# Patient Record
Sex: FEMALE | Race: WHITE | Employment: FULL TIME | URBAN - METROPOLITAN AREA
[De-identification: names, ages, dates, MRNs, and addresses within clinical notes are randomized per-mention and may not be internally consistent; named-entity substitution may affect disease eponyms.]

---

## 2017-02-20 NOTE — TELEPHONE ENCOUNTER
Last OV pertinent to medication: 3/29/16  Last refill date: 11/21/16     #/refills: 180/0  When pt was asked to return for OV: none  Upcoming appt/reason: none     Lab Results  Component Value Date   * 08/24/2016   BUN 12 08/24/2016   CREATSERUM 0.7

## 2017-02-27 RX ORDER — LANCETS 33 GAUGE
EACH MISCELLANEOUS
Qty: 100 EACH | Refills: 0 | Status: SHIPPED | OUTPATIENT
Start: 2017-02-27 | End: 2017-05-26

## 2017-03-09 RX ORDER — LISINOPRIL AND HYDROCHLOROTHIAZIDE 20; 12.5 MG/1; MG/1
TABLET ORAL
Qty: 90 TABLET | Refills: 0 | Status: SHIPPED | OUTPATIENT
Start: 2017-03-09 | End: 2017-06-06

## 2017-03-09 RX ORDER — LABETALOL 100 MG/1
TABLET, FILM COATED ORAL
Qty: 180 TABLET | Refills: 0 | Status: SHIPPED | OUTPATIENT
Start: 2017-03-09 | End: 2017-06-06

## 2017-03-09 RX ORDER — ATORVASTATIN CALCIUM 40 MG/1
TABLET, FILM COATED ORAL
Qty: 90 TABLET | Refills: 0 | Status: SHIPPED | OUTPATIENT
Start: 2017-03-09 | End: 2017-06-06

## 2017-03-09 NOTE — TELEPHONE ENCOUNTER
Did not pass old protocol, no ov in past 6 months. Passes revised protocol with ov in next 3 months.     Last OV pertinent to medication: 3/29/16  Last refill date: 12/12/16     #/refills: #90/0  When pt was asked to return for OV: n/a  Upcoming appt/reaso

## 2017-03-22 ENCOUNTER — APPOINTMENT (OUTPATIENT)
Dept: LAB | Age: 47
End: 2017-03-22
Attending: INTERNAL MEDICINE
Payer: COMMERCIAL

## 2017-03-22 DIAGNOSIS — R73.02 IGT (IMPAIRED GLUCOSE TOLERANCE): ICD-10-CM

## 2017-03-22 DIAGNOSIS — I10 ESSENTIAL HYPERTENSION: ICD-10-CM

## 2017-03-22 DIAGNOSIS — E78.00 PURE HYPERCHOLESTEROLEMIA: ICD-10-CM

## 2017-03-22 LAB
ALBUMIN SERPL-MCNC: 3.9 G/DL (ref 3.5–4.8)
ALP LIVER SERPL-CCNC: 80 U/L (ref 39–100)
ALT SERPL-CCNC: 25 U/L (ref 14–54)
AST SERPL-CCNC: 13 U/L (ref 15–41)
BILIRUB SERPL-MCNC: 0.3 MG/DL (ref 0.1–2)
BUN BLD-MCNC: 14 MG/DL (ref 8–20)
CALCIUM BLD-MCNC: 8.8 MG/DL (ref 8.3–10.3)
CHLORIDE: 100 MMOL/L (ref 101–111)
CHOLEST SMN-MCNC: 195 MG/DL (ref ?–200)
CO2: 30 MMOL/L (ref 22–32)
CREAT BLD-MCNC: 0.82 MG/DL (ref 0.55–1.02)
EST. AVERAGE GLUCOSE BLD GHB EST-MCNC: 126 MG/DL (ref 68–126)
GLUCOSE BLD-MCNC: 123 MG/DL (ref 70–99)
HBA1C MFR BLD HPLC: 6 % (ref ?–5.7)
HDLC SERPL-MCNC: 77 MG/DL (ref 45–?)
HDLC SERPL: 2.53 {RATIO} (ref ?–4.44)
LDLC SERPL CALC-MCNC: 93 MG/DL (ref ?–130)
M PROTEIN MFR SERPL ELPH: 7.3 G/DL (ref 6.1–8.3)
NONHDLC SERPL-MCNC: 118 MG/DL (ref ?–130)
POTASSIUM SERPL-SCNC: 4.2 MMOL/L (ref 3.6–5.1)
SODIUM SERPL-SCNC: 137 MMOL/L (ref 136–144)
TRIGLYCERIDES: 126 MG/DL (ref ?–150)
VLDL: 25 MG/DL (ref 5–40)

## 2017-03-22 PROCEDURE — 80061 LIPID PANEL: CPT

## 2017-03-22 PROCEDURE — 83036 HEMOGLOBIN GLYCOSYLATED A1C: CPT

## 2017-03-22 PROCEDURE — 80053 COMPREHEN METABOLIC PANEL: CPT

## 2017-03-23 ENCOUNTER — OFFICE VISIT (OUTPATIENT)
Dept: INTERNAL MEDICINE CLINIC | Facility: CLINIC | Age: 47
End: 2017-03-23

## 2017-03-23 DIAGNOSIS — R73.02 IGT (IMPAIRED GLUCOSE TOLERANCE): ICD-10-CM

## 2017-03-23 DIAGNOSIS — J45.20 MILD INTERMITTENT ASTHMA WITHOUT COMPLICATION: Primary | ICD-10-CM

## 2017-03-23 DIAGNOSIS — E78.00 PURE HYPERCHOLESTEROLEMIA: ICD-10-CM

## 2017-03-23 DIAGNOSIS — I10 ESSENTIAL HYPERTENSION: ICD-10-CM

## 2017-03-23 PROCEDURE — 99214 OFFICE O/P EST MOD 30 MIN: CPT | Performed by: INTERNAL MEDICINE

## 2017-03-23 RX ORDER — DOXEPIN HYDROCHLORIDE 50 MG/1
1 CAPSULE ORAL DAILY
COMMUNITY

## 2017-03-23 NOTE — PROGRESS NOTES
Camden Noonan is a 55year old female. To F/U from last visit regarding IGT, HTN and high cholesterol   HPI:    Interim history:she is feeling fine .  Still travels fro her work  She checks an occ blood sugar  Her asthma is controlled, she reacts a bit breath with exertion,or cough ACT 21   CARDIOVASCULAR: denies chest pain on exertion  GI: denies abdominal pain and denies heartburn, change in appetite or bm's  NEURO: denies headaches or dizziness    EXAM:   /76 mmHg  Pulse 76  Temp(Src) 98.2 °F (3 CPM  Essential hypertension- controlled CPM    No orders of the defined types were placed in this encounter.        Meds & Refills for this Visit:    No prescriptions requested or ordered in this encounter       Imaging & Consults:  None    No Follow-up on

## 2017-05-01 ENCOUNTER — OFFICE VISIT (OUTPATIENT)
Dept: INTERNAL MEDICINE CLINIC | Facility: CLINIC | Age: 47
End: 2017-05-01

## 2017-05-01 VITALS
SYSTOLIC BLOOD PRESSURE: 114 MMHG | DIASTOLIC BLOOD PRESSURE: 82 MMHG | HEART RATE: 62 BPM | TEMPERATURE: 98 F | OXYGEN SATURATION: 99 % | RESPIRATION RATE: 16 BRPM

## 2017-05-01 DIAGNOSIS — J06.9 UPPER RESPIRATORY TRACT INFECTION, UNSPECIFIED TYPE: ICD-10-CM

## 2017-05-01 DIAGNOSIS — Z91.09 ENVIRONMENTAL ALLERGIES: ICD-10-CM

## 2017-05-01 DIAGNOSIS — R05.9 COUGH: Primary | ICD-10-CM

## 2017-05-01 PROCEDURE — 99213 OFFICE O/P EST LOW 20 MIN: CPT | Performed by: PHYSICIAN ASSISTANT

## 2017-05-01 RX ORDER — CODEINE PHOSPHATE AND GUAIFENESIN 10; 100 MG/5ML; MG/5ML
5 SOLUTION ORAL EVERY 6 HOURS PRN
Qty: 118 ML | Refills: 0 | Status: SHIPPED | OUTPATIENT
Start: 2017-05-01 | End: 2017-05-06

## 2017-05-01 RX ORDER — AZITHROMYCIN 250 MG/1
TABLET, FILM COATED ORAL
Qty: 6 TABLET | Refills: 0 | Status: SHIPPED | OUTPATIENT
Start: 2017-05-01 | End: 2017-05-26 | Stop reason: ALTCHOICE

## 2017-05-01 NOTE — PROGRESS NOTES
Abdifatah Hernandez is a 52year old female  Patient presents with:  URI  Bleeding: Nose Bleeds      HPI:   Pt states that she started to feel sick last week  - more similar to sinus infection but then it started to get better until yesterday when she start Levocetirizine Dihydrochloride (XYZAL) 5 MG Oral Tab Take 1 tablet by mouth daily.  Disp:  Rfl:       Patient Active Problem List:     HTN (hypertension)     Hyperlipemia     IGT (impaired glucose tolerance)     Mild intermittent asthma without complicati to help with dry nose/bleeding  - advised to contact office if symptoms fail to improve or worsen in next 3-4 days    No orders of the defined types were placed in this encounter.        Meds & Refills for this Visit:  Signed Prescriptions Disp Refills    a

## 2017-05-19 NOTE — TELEPHONE ENCOUNTER
Last OV pertinent to medication: 3/23/17  Last refill date: 2/20/17     #/refills: 180/0  When pt was asked to return for OV:6 months  Upcoming appt/reason: 9/21/17 a1c f/u     Lab Results  Component Value Date    03/22/2017   A1C 6.0* 03/22/2017

## 2017-05-26 RX ORDER — LANCETS 33 GAUGE
EACH MISCELLANEOUS
Qty: 100 EACH | Refills: 1 | Status: SHIPPED | OUTPATIENT
Start: 2017-05-26 | End: 2017-11-22

## 2017-06-07 RX ORDER — LABETALOL 100 MG/1
TABLET, FILM COATED ORAL
Qty: 180 TABLET | Refills: 0 | Status: SHIPPED | OUTPATIENT
Start: 2017-06-07 | End: 2017-09-05

## 2017-06-07 RX ORDER — ATORVASTATIN CALCIUM 40 MG/1
TABLET, FILM COATED ORAL
Qty: 90 TABLET | Refills: 0 | Status: SHIPPED | OUTPATIENT
Start: 2017-06-07 | End: 2017-09-05

## 2017-06-07 RX ORDER — LISINOPRIL AND HYDROCHLOROTHIAZIDE 20; 12.5 MG/1; MG/1
TABLET ORAL
Qty: 90 TABLET | Refills: 0 | Status: SHIPPED | OUTPATIENT
Start: 2017-06-07 | End: 2017-09-05

## 2017-06-27 ENCOUNTER — HOSPITAL ENCOUNTER (OUTPATIENT)
Age: 47
Discharge: HOME | End: 2017-06-27
Attending: FAMILY MEDICINE
Payer: COMMERCIAL

## 2017-06-27 VITALS
BODY MASS INDEX: 32.44 KG/M2 | RESPIRATION RATE: 18 BRPM | HEART RATE: 81 BPM | TEMPERATURE: 98 F | DIASTOLIC BLOOD PRESSURE: 100 MMHG | HEIGHT: 64 IN | OXYGEN SATURATION: 96 % | WEIGHT: 190 LBS | SYSTOLIC BLOOD PRESSURE: 148 MMHG

## 2017-06-27 DIAGNOSIS — B02.9 HERPES ZOSTER WITHOUT COMPLICATION: Primary | ICD-10-CM

## 2017-06-27 PROCEDURE — 99214 OFFICE O/P EST MOD 30 MIN: CPT

## 2017-06-27 PROCEDURE — 99213 OFFICE O/P EST LOW 20 MIN: CPT

## 2017-06-27 RX ORDER — VALACYCLOVIR HYDROCHLORIDE 1 G/1
TABLET, FILM COATED ORAL
Qty: 21 TABLET | Refills: 0 | Status: SHIPPED | OUTPATIENT
Start: 2017-06-27 | End: 2017-10-19 | Stop reason: ALTCHOICE

## 2017-06-28 NOTE — ED PROVIDER NOTES
Patient Seen in: [facility] END    History   Patient presents with:  Rash Skin Problem (integumentary)    Stated Complaint: woke up this morning with a rash on her back, burns and itches    HPI    This 59-year-old female presents to Tab,  Take 1 tablet by mouth daily. Albuterol Sulfate HFA (PROAIR HFA) 108 (90 BASE) MCG/ACT Inhalation Aero Soln,  Inhale 1-2 puffs into the lungs every 4 (four) hours as needed.    Levocetirizine Dihydrochloride (XYZAL) 5 MG Oral Tab,  Take 1 tablet by normal.  EARS: Tympanic membranes normal, EAC's normal.  NOSE: Turbinates normal, no bleeding noted. PHARYNX:  No eythema or exudates, tonsils normal size, airway patent, uvula midline  NECK:  No cervical lymphadenopathy.  No thyromegaly,  HEART: Regular r and pregnant women until the rash has resolved. Follow-up with your primary doctor in 3-5 days for any new or worsening symptoms. The rash may take 7-10 days to resolve.

## 2017-06-28 NOTE — ED INITIAL ASSESSMENT (HPI)
Started last night with a pimple and it itched  Burning sensation  Presents with bandaide to back left side scapula

## 2017-08-17 NOTE — TELEPHONE ENCOUNTER
Last OV pertinent to medication: 3/23/17  Last refill date: 5/19/17     #/refills: 180/0  When pt was asked to return for OV: none noted  Upcoming appt/reason: 9/21/17 f/u a1c    Lab Results  Component Value Date    03/22/2017   A1C 6.0 (H) 03/22/20

## 2017-09-01 ENCOUNTER — PATIENT MESSAGE (OUTPATIENT)
Dept: INTERNAL MEDICINE CLINIC | Facility: CLINIC | Age: 47
End: 2017-09-01

## 2017-09-01 RX ORDER — BLOOD-GLUCOSE METER
KIT MISCELLANEOUS
Qty: 1 DEVICE | Refills: 0 | Status: SHIPPED | OUTPATIENT
Start: 2017-09-01

## 2017-09-01 NOTE — TELEPHONE ENCOUNTER
From: Janet Nick  To: Saima Rincon MD  Sent: 9/1/2017 12:01 PM CDT  Subject: Prescription Question    Do I need a new meter or will those Freestyle Strips fit my One Touch?

## 2017-09-06 RX ORDER — LABETALOL 100 MG/1
TABLET, FILM COATED ORAL
Qty: 180 TABLET | Refills: 0 | Status: SHIPPED | OUTPATIENT
Start: 2017-09-06 | End: 2017-12-05

## 2017-09-06 RX ORDER — LISINOPRIL AND HYDROCHLOROTHIAZIDE 20; 12.5 MG/1; MG/1
TABLET ORAL
Qty: 90 TABLET | Refills: 0 | Status: SHIPPED | OUTPATIENT
Start: 2017-09-06 | End: 2017-12-05

## 2017-09-06 RX ORDER — ATORVASTATIN CALCIUM 40 MG/1
TABLET, FILM COATED ORAL
Qty: 90 TABLET | Refills: 0 | Status: SHIPPED | OUTPATIENT
Start: 2017-09-06 | End: 2017-12-05

## 2017-10-26 ENCOUNTER — TELEPHONE (OUTPATIENT)
Dept: OBGYN CLINIC | Facility: CLINIC | Age: 47
End: 2017-10-26

## 2017-10-26 NOTE — TELEPHONE ENCOUNTER
Pt has requested records to be sent To Dr Porsche Severino: pap, colpo,IUD insertion/removal from any years of treatment  [address], [address] [address]  Sent to scan October 26,2017

## 2017-11-15 ENCOUNTER — TELEPHONE (OUTPATIENT)
Dept: INTERNAL MEDICINE CLINIC | Facility: CLINIC | Age: 47
End: 2017-11-15

## 2017-11-15 NOTE — TELEPHONE ENCOUNTER
Incoming (mail or fax): Fax  Received from:  [facility]  Documentation given to:  Shiloh Cornell test results bin.

## 2017-11-15 NOTE — TELEPHONE ENCOUNTER
Received mammogram from [facility], results WNL. To Dr Antonio Centeno for review.

## 2017-11-15 NOTE — TELEPHONE ENCOUNTER
Last OV relevant to medication: 5/1/17  Last refill date: 8/17/17     #/refills: 180/0  When pt was asked to return for OV: none  Upcoming appt/reason: none    Lab Results  Component Value Date    03/22/2017   A1C 6.0 (H) 03/22/2017

## 2017-11-22 RX ORDER — LANCETS 33 GAUGE
EACH MISCELLANEOUS
Qty: 100 EACH | Refills: 1 | Status: SHIPPED | OUTPATIENT
Start: 2017-11-22 | End: 2018-03-08 | Stop reason: ALTCHOICE

## 2017-11-29 RX ORDER — BLOOD-GLUCOSE METER
KIT MISCELLANEOUS
Qty: 100 STRIP | Refills: 0 | Status: SHIPPED | OUTPATIENT
Start: 2017-11-29 | End: 2018-09-04

## 2017-11-29 NOTE — TELEPHONE ENCOUNTER
Received call from [employer] [place] [employer]. States that [employer] plan does not cover Onetouch Ultra Blue strips, but does cover Freestyle lite strips. Order for Freestyle lite strips pended. Please advise.   OK to change to [facility]

## 2017-11-29 NOTE — TELEPHONE ENCOUNTER
Incoming (mail or fax): Fax  Received from:  [facility]  Documentation given to:  [id number] [employer] fax bin.

## 2017-12-05 RX ORDER — LABETALOL 100 MG/1
TABLET, FILM COATED ORAL
Qty: 180 TABLET | Refills: 1 | Status: SHIPPED | OUTPATIENT
Start: 2017-12-05 | End: 2018-06-03

## 2017-12-05 RX ORDER — ATORVASTATIN CALCIUM 40 MG/1
TABLET, FILM COATED ORAL
Qty: 90 TABLET | Refills: 0 | Status: SHIPPED | OUTPATIENT
Start: 2017-12-05 | End: 2018-03-05

## 2017-12-05 RX ORDER — LISINOPRIL AND HYDROCHLOROTHIAZIDE 20; 12.5 MG/1; MG/1
TABLET ORAL
Qty: 90 TABLET | Refills: 1 | Status: SHIPPED | OUTPATIENT
Start: 2017-12-05 | End: 2018-06-03

## 2017-12-05 NOTE — TELEPHONE ENCOUNTER
Last OV relevant to medication: 3/23/2017  Last refill date: 9/6/2017     #/refills: 90/0  When pt was asked to return for OV: none noted  Upcoming appt/reason: none

## 2018-02-07 ENCOUNTER — HOSPITAL ENCOUNTER (OUTPATIENT)
Age: 48
Discharge: HOME | End: 2018-02-07
Attending: FAMILY MEDICINE
Payer: COMMERCIAL

## 2018-02-07 VITALS
TEMPERATURE: 98 F | DIASTOLIC BLOOD PRESSURE: 80 MMHG | HEART RATE: 70 BPM | RESPIRATION RATE: 20 BRPM | OXYGEN SATURATION: 97 % | SYSTOLIC BLOOD PRESSURE: 137 MMHG

## 2018-02-07 DIAGNOSIS — J01.91 ACUTE RECURRENT SINUSITIS, UNSPECIFIED LOCATION: Primary | ICD-10-CM

## 2018-02-07 PROCEDURE — 99213 OFFICE O/P EST LOW 20 MIN: CPT

## 2018-02-07 PROCEDURE — 99214 OFFICE O/P EST MOD 30 MIN: CPT

## 2018-02-07 RX ORDER — FLUTICASONE PROPIONATE 50 MCG
2 SPRAY, SUSPENSION (ML) NASAL DAILY
Qty: 16 G | Refills: 0 | Status: SHIPPED | OUTPATIENT
Start: 2018-02-07 | End: 2018-03-09

## 2018-02-07 RX ORDER — DOXYCYCLINE HYCLATE 100 MG
100 TABLET ORAL 2 TIMES DAILY
Qty: 20 TABLET | Refills: 0 | Status: SHIPPED | OUTPATIENT
Start: 2018-02-07 | End: 2018-02-17

## 2018-02-07 NOTE — ED INITIAL ASSESSMENT (HPI)
Head and nose congestion for the last five days. Also has been having chest congestion and hard to take deep breaths.

## 2018-02-08 NOTE — ED PROVIDER NOTES
Patient Seen in: [facility] END    History   Patient presents with:  Stuffy Nose    Stated Complaint: 5 DAY COLD SYMPTOMS,STUFFY NOSE,SOME COUGH    HPI    52year old female presents for nasal congestion, sinus pressure and cough.  Trupti Shah other systems reviewed and negative except as noted above.     Physical Exam   ED Triage Vitals [02/07/18 1020]  BP: 137/80  Pulse: 70  Resp: 20  Temp: 98.2 °F (36.8 °C)  Temp src: Temporal  SpO2: 97 %  O2 Device: None (Room air)    Current:/80   Puls diagnosis)    Disposition:  Discharge  2/7/2018 10:46 am    Follow-up:  Demar Field, [address] [phone]    In 1 week  If symptoms worsen        Medications Prescribed:  Discharge Medication List a

## 2018-02-19 ENCOUNTER — TELEPHONE (OUTPATIENT)
Dept: INTERNAL MEDICINE CLINIC | Facility: CLINIC | Age: 48
End: 2018-02-19

## 2018-02-19 DIAGNOSIS — E78.00 PURE HYPERCHOLESTEROLEMIA: ICD-10-CM

## 2018-02-19 DIAGNOSIS — I10 ESSENTIAL HYPERTENSION: ICD-10-CM

## 2018-02-19 DIAGNOSIS — R73.02 IGT (IMPAIRED GLUCOSE TOLERANCE): Primary | ICD-10-CM

## 2018-02-19 NOTE — TELEPHONE ENCOUNTER
Pt has IGT. Last A1c 3/2017, pt went to lab thinking an order was there. No order on file. Due for annual visit, do you want this checked now or have pt schedule annual and order with any other necessary labs at that time? Thanks!

## 2018-02-19 NOTE — TELEPHONE ENCOUNTER
Pt stopped in to lab to have A1C, there were no orders for her. Thought she was overdue. Please advise if she needs this.  Thank you

## 2018-02-19 NOTE — TELEPHONE ENCOUNTER
Labs ordered. Spoke with pt, advised A1c, CMP, Lipid ordered, fast 10-12 hrs. Pt verbalized understanding.

## 2018-02-20 ENCOUNTER — APPOINTMENT (OUTPATIENT)
Dept: LAB | Age: 48
End: 2018-02-20
Attending: INTERNAL MEDICINE
Payer: COMMERCIAL

## 2018-02-20 DIAGNOSIS — R73.02 IGT (IMPAIRED GLUCOSE TOLERANCE): ICD-10-CM

## 2018-02-20 DIAGNOSIS — E78.00 PURE HYPERCHOLESTEROLEMIA: ICD-10-CM

## 2018-02-20 DIAGNOSIS — I10 ESSENTIAL HYPERTENSION: ICD-10-CM

## 2018-02-20 LAB
ALBUMIN SERPL-MCNC: 3.7 G/DL (ref 3.5–4.8)
ALP LIVER SERPL-CCNC: 80 U/L (ref 39–100)
ALT SERPL-CCNC: 23 U/L (ref 14–54)
AST SERPL-CCNC: 14 U/L (ref 15–41)
BILIRUB SERPL-MCNC: 0.5 MG/DL (ref 0.1–2)
BUN BLD-MCNC: 11 MG/DL (ref 8–20)
CALCIUM BLD-MCNC: 8.9 MG/DL (ref 8.3–10.3)
CHLORIDE: 102 MMOL/L (ref 101–111)
CHOLEST SMN-MCNC: 179 MG/DL (ref ?–200)
CO2: 29 MMOL/L (ref 22–32)
CREAT BLD-MCNC: 0.64 MG/DL (ref 0.55–1.02)
EST. AVERAGE GLUCOSE BLD GHB EST-MCNC: 140 MG/DL (ref 68–126)
GLUCOSE BLD-MCNC: 112 MG/DL (ref 70–99)
HBA1C MFR BLD HPLC: 6.5 % (ref ?–5.7)
HDLC SERPL-MCNC: 80 MG/DL (ref 45–?)
HDLC SERPL: 2.24 {RATIO} (ref ?–4.44)
LDLC SERPL CALC-MCNC: 72 MG/DL (ref ?–130)
M PROTEIN MFR SERPL ELPH: 6.8 G/DL (ref 6.1–8.3)
NONHDLC SERPL-MCNC: 99 MG/DL (ref ?–130)
POTASSIUM SERPL-SCNC: 4.2 MMOL/L (ref 3.6–5.1)
SODIUM SERPL-SCNC: 137 MMOL/L (ref 136–144)
TRIGL SERPL-MCNC: 136 MG/DL (ref ?–150)
VLDLC SERPL CALC-MCNC: 27 MG/DL (ref 5–40)

## 2018-02-20 PROCEDURE — 80061 LIPID PANEL: CPT | Performed by: INTERNAL MEDICINE

## 2018-02-20 PROCEDURE — 83036 HEMOGLOBIN GLYCOSYLATED A1C: CPT | Performed by: INTERNAL MEDICINE

## 2018-02-20 PROCEDURE — 80053 COMPREHEN METABOLIC PANEL: CPT | Performed by: INTERNAL MEDICINE

## 2018-02-20 PROCEDURE — 36415 COLL VENOUS BLD VENIPUNCTURE: CPT | Performed by: INTERNAL MEDICINE

## 2018-03-08 ENCOUNTER — OFFICE VISIT (OUTPATIENT)
Dept: INTERNAL MEDICINE CLINIC | Facility: CLINIC | Age: 48
End: 2018-03-08

## 2018-03-08 VITALS
RESPIRATION RATE: 14 BRPM | BODY MASS INDEX: 35.17 KG/M2 | SYSTOLIC BLOOD PRESSURE: 120 MMHG | HEIGHT: 63.5 IN | HEART RATE: 68 BPM | OXYGEN SATURATION: 99 % | WEIGHT: 201 LBS | TEMPERATURE: 98 F | DIASTOLIC BLOOD PRESSURE: 80 MMHG

## 2018-03-08 DIAGNOSIS — I10 ESSENTIAL HYPERTENSION: ICD-10-CM

## 2018-03-08 DIAGNOSIS — E78.00 PURE HYPERCHOLESTEROLEMIA: ICD-10-CM

## 2018-03-08 DIAGNOSIS — R73.02 IGT (IMPAIRED GLUCOSE TOLERANCE): Primary | ICD-10-CM

## 2018-03-08 PROCEDURE — 99213 OFFICE O/P EST LOW 20 MIN: CPT | Performed by: INTERNAL MEDICINE

## 2018-03-08 RX ORDER — ATORVASTATIN CALCIUM 40 MG/1
TABLET, FILM COATED ORAL
Qty: 90 TABLET | Refills: 1 | Status: SHIPPED | OUTPATIENT
Start: 2018-03-08 | End: 2018-09-02

## 2018-03-08 NOTE — PROGRESS NOTES
James Jin is a 52year old female.  To F/U from last visit regarding pre-diabetes and HTN and high cholesterol  HPI:    Interim history:very stessed at work  No watching her diet or exercising, too tired  Had a bad URI last month and has not bounce (hypertension)     Hyperlipemia     IGT (impaired glucose tolerance)     Mild intermittent asthma without complication        REVIEW OF SYSTEMS:   GENERAL HEALTH: feels well otherwise      EXAM:   /80 (BP Location: Left arm, Patient Position: Sitting 6/8/2018) for multiple issues. There are no Patient Instructions on file for this visit. The patient indicates understanding of these issues and agrees to the plan.

## 2018-03-18 ENCOUNTER — HOSPITAL ENCOUNTER (OUTPATIENT)
Age: 48
Discharge: HOME | End: 2018-03-18
Payer: COMMERCIAL

## 2018-03-18 VITALS
RESPIRATION RATE: 18 BRPM | TEMPERATURE: 98 F | OXYGEN SATURATION: 99 % | SYSTOLIC BLOOD PRESSURE: 149 MMHG | DIASTOLIC BLOOD PRESSURE: 83 MMHG | HEART RATE: 82 BPM

## 2018-03-18 DIAGNOSIS — J06.9 UPPER RESPIRATORY TRACT INFECTION, UNSPECIFIED TYPE: ICD-10-CM

## 2018-03-18 DIAGNOSIS — J32.0 CHRONIC MAXILLARY SINUSITIS: Primary | ICD-10-CM

## 2018-03-18 PROCEDURE — 99214 OFFICE O/P EST MOD 30 MIN: CPT

## 2018-03-18 PROCEDURE — 99213 OFFICE O/P EST LOW 20 MIN: CPT

## 2018-03-18 RX ORDER — ALBUTEROL SULFATE 90 UG/1
2 AEROSOL, METERED RESPIRATORY (INHALATION) EVERY 4 HOURS PRN
Qty: 1 INHALER | Refills: 0 | Status: SHIPPED | OUTPATIENT
Start: 2018-03-18 | End: 2018-04-17

## 2018-03-18 RX ORDER — PREDNISONE 20 MG/1
40 TABLET ORAL DAILY
Qty: 10 TABLET | Refills: 0 | Status: SHIPPED | OUTPATIENT
Start: 2018-03-18 | End: 2018-03-23

## 2018-03-18 RX ORDER — CEFDINIR 300 MG/1
300 CAPSULE ORAL 2 TIMES DAILY
Qty: 20 CAPSULE | Refills: 0 | Status: SHIPPED | OUTPATIENT
Start: 2018-03-18 | End: 2018-03-28

## 2018-03-18 NOTE — ED PROVIDER NOTES
Patient Seen in: [facility]    History   Patient presents with:  Cough/URI    Stated Complaint: SHORTNESS OF BREATH/BODY ACHES/FACE PREASURE    HPI    Patient is a pleasant 55-year-old female.   Patient is moderate seasonal aller 0.6 oz/week     Standard drinks or equivalent: 0 - 1 per week     Comment: 2-3 glass of wine on the weekends      Review of Systems    Positive for stated complaint: SHORTNESS OF BREATH/BODY ACHES/FACE PREASURE  Other systems are as noted in HPI.   Constitu diagnosis)  Upper respiratory tract infection, unspecified type    Disposition:  Discharge  3/18/2018 11:55 am    Follow-up:  Sean James, [address] [phone]              Medications Prescribed:

## 2018-03-18 NOTE — ED INITIAL ASSESSMENT (HPI)
C/O chills, bodyaches and fever that started Friday. C/O sinus issues that she has been seen for last month as well.

## 2018-05-14 NOTE — TELEPHONE ENCOUNTER
Last OV relevant to medication: 3/8/18  Last refill date: 11/15/17    #180/refills:1  When pt was asked to return for OV: 3 months  Upcoming appt/reason:6/21/18  Didn't pass protocol due to no urine micro alb

## 2018-06-04 RX ORDER — LABETALOL 100 MG/1
TABLET, FILM COATED ORAL
Qty: 180 TABLET | Refills: 1 | Status: SHIPPED | OUTPATIENT
Start: 2018-06-04 | End: 2018-12-01

## 2018-06-04 RX ORDER — LISINOPRIL AND HYDROCHLOROTHIAZIDE 20; 12.5 MG/1; MG/1
TABLET ORAL
Qty: 90 TABLET | Refills: 1 | Status: SHIPPED | OUTPATIENT
Start: 2018-06-04 | End: 2018-12-01

## 2018-07-16 ENCOUNTER — APPOINTMENT (OUTPATIENT)
Dept: LAB | Age: 48
End: 2018-07-16
Attending: INTERNAL MEDICINE
Payer: COMMERCIAL

## 2018-07-16 DIAGNOSIS — E78.00 PURE HYPERCHOLESTEROLEMIA: ICD-10-CM

## 2018-07-16 DIAGNOSIS — I10 ESSENTIAL HYPERTENSION: ICD-10-CM

## 2018-07-16 DIAGNOSIS — R73.02 IGT (IMPAIRED GLUCOSE TOLERANCE): ICD-10-CM

## 2018-07-16 LAB
EST. AVERAGE GLUCOSE BLD GHB EST-MCNC: 134 MG/DL (ref 68–126)
HBA1C MFR BLD HPLC: 6.3 % (ref ?–5.7)

## 2018-07-16 PROCEDURE — 83036 HEMOGLOBIN GLYCOSYLATED A1C: CPT | Performed by: INTERNAL MEDICINE

## 2018-07-16 PROCEDURE — 36415 COLL VENOUS BLD VENIPUNCTURE: CPT | Performed by: INTERNAL MEDICINE

## 2018-07-17 ENCOUNTER — OFFICE VISIT (OUTPATIENT)
Dept: INTERNAL MEDICINE CLINIC | Facility: CLINIC | Age: 48
End: 2018-07-17
Payer: COMMERCIAL

## 2018-07-17 ENCOUNTER — PATIENT MESSAGE (OUTPATIENT)
Dept: INTERNAL MEDICINE CLINIC | Facility: CLINIC | Age: 48
End: 2018-07-17

## 2018-07-17 VITALS
DIASTOLIC BLOOD PRESSURE: 76 MMHG | WEIGHT: 190 LBS | SYSTOLIC BLOOD PRESSURE: 112 MMHG | BODY MASS INDEX: 33.25 KG/M2 | HEIGHT: 63.5 IN | OXYGEN SATURATION: 99 % | RESPIRATION RATE: 16 BRPM | HEART RATE: 76 BPM | TEMPERATURE: 98 F

## 2018-07-17 VITALS
BODY MASS INDEX: 34.82 KG/M2 | HEIGHT: 63.5 IN | SYSTOLIC BLOOD PRESSURE: 130 MMHG | WEIGHT: 199 LBS | TEMPERATURE: 98 F | HEART RATE: 76 BPM | OXYGEN SATURATION: 98 % | DIASTOLIC BLOOD PRESSURE: 84 MMHG

## 2018-07-17 DIAGNOSIS — R73.02 IGT (IMPAIRED GLUCOSE TOLERANCE): Primary | ICD-10-CM

## 2018-07-17 PROCEDURE — 99213 OFFICE O/P EST LOW 20 MIN: CPT | Performed by: INTERNAL MEDICINE

## 2018-07-17 NOTE — PROGRESS NOTES
James Jin is a 50year old female. To F/U from last visit regarding IGT  HPI:    Interim history:her A1C was 6.5 3 mos ago. She just returned from a 4 week trip to [employer] but walked an enormous amount. No wt change.  Ate a lot there  She gets int otherwise    EXAM:   /84 (BP Location: Left arm, Patient Position: Sitting, Cuff Size: adult)   Pulse 76   Temp 98.1 °F (36.7 °C) (Oral)   Ht 63.5\"   Wt 199 lb   LMP 07/08/2018   SpO2 98%   BMI 34.70 kg/m²   GENERAL: well developed, well nourished,i

## 2018-07-18 NOTE — TELEPHONE ENCOUNTER
From: Carlo Kearney  To: Juwan Gonzalez MD  Sent: 7/17/2018 5:09 PM CDT  Subject: Test Results Question    Hi there! Dr. Kaitlynn Wells was [facility] and for some reason I haven't gotten them.  Can someone please send

## 2018-08-14 NOTE — TELEPHONE ENCOUNTER
Last OV relevant to medication: 7/17/18  Last refill date: 5/14/18     #/refills: 180/0  When pt was asked to return for OV: 3 months - PE  Upcoming appt/reason: 10/9/18 - PE    Lab Results  Component Value Date    (H) 07/16/2018   A1C 6.3 (H) 07/16

## 2018-09-03 ENCOUNTER — PATIENT MESSAGE (OUTPATIENT)
Dept: INTERNAL MEDICINE CLINIC | Facility: CLINIC | Age: 48
End: 2018-09-03

## 2018-09-03 RX ORDER — BLOOD-GLUCOSE METER
KIT MISCELLANEOUS
Qty: 100 STRIP | Refills: 0 | Status: CANCELLED
Start: 2018-09-03

## 2018-09-04 RX ORDER — LANCETS 28 GAUGE
1 EACH MISCELLANEOUS DAILY
Qty: 1 BOX | Refills: 0 | Status: SHIPPED | OUTPATIENT
Start: 2018-09-04 | End: 2019-03-04

## 2018-09-04 RX ORDER — BLOOD-GLUCOSE METER
KIT MISCELLANEOUS
Qty: 50 STRIP | Refills: 0 | Status: SHIPPED | OUTPATIENT
Start: 2018-09-04 | End: 2019-03-04

## 2018-09-04 RX ORDER — BLOOD-GLUCOSE METER
KIT MISCELLANEOUS
Qty: 100 STRIP | Refills: 3 | Status: SHIPPED | OUTPATIENT
Start: 2018-09-04 | End: 2019-09-01

## 2018-09-04 RX ORDER — ATORVASTATIN CALCIUM 40 MG/1
TABLET, FILM COATED ORAL
Qty: 90 TABLET | Refills: 0 | Status: SHIPPED | OUTPATIENT
Start: 2018-09-04 | End: 2018-12-02

## 2018-09-04 RX ORDER — LANCETS 28 GAUGE
1 EACH MISCELLANEOUS DAILY
Qty: 100 EACH | Refills: 3 | Status: SHIPPED | OUTPATIENT
Start: 2018-09-04 | End: 2019-09-04

## 2018-09-04 NOTE — TELEPHONE ENCOUNTER
From: Jose C Urban  To: Lizzeth Beltran MD  Sent: 9/3/2018 4:19 PM CDT  Subject: Prescription Question    Our insurance stopped covering One Touch and no longer ships the lancelets and test strips through [facility] any longer.  They have

## 2018-09-04 NOTE — TELEPHONE ENCOUNTER
From: Huyen Samano  Sent: 9/3/2018 11:20 AM CDT  Subject: Medication Renewal Request    Joe Singh.  George Joseph would like a refill of the following medications:     Glucose Blood (FREESTYLE LITE TEST) In Vitro Strip [Edmar Cruz, NP]   Patient Comment:

## 2018-09-04 NOTE — TELEPHONE ENCOUNTER
From: Jeremiah Stack  To: Samson Eugene MD  Sent: 9/3/2018 4:20 PM CDT  Subject: Prescription Question    Correction it's a Freestyle Lite. Thanks.

## 2018-09-04 NOTE — PROGRESS NOTES
Spoke with pt to clarify pharmacies. Needs short term supply to local, full rx to mailorder. Spoke with pharmacy, lowest qty for strips is 50, for lancets 100. Pt advised.

## 2018-11-12 NOTE — TELEPHONE ENCOUNTER
Left Message for Patient to call back and schedule PE with Dr. Mallorie Frias. Awaiting call back.       Last OV relevant to medication: 7/17/2018  Last refill date: 8/14/18     #/refills: 180/0  When pt was asked to return for OV: 3 months - PE  Upcoming appt/reas

## 2018-12-03 RX ORDER — ATORVASTATIN CALCIUM 40 MG/1
TABLET, FILM COATED ORAL
Qty: 90 TABLET | Refills: 0 | Status: SHIPPED | OUTPATIENT
Start: 2018-12-03 | End: 2019-03-04

## 2018-12-03 RX ORDER — LISINOPRIL AND HYDROCHLOROTHIAZIDE 20; 12.5 MG/1; MG/1
TABLET ORAL
Qty: 90 TABLET | Refills: 0 | Status: SHIPPED | OUTPATIENT
Start: 2018-12-03 | End: 2019-03-04

## 2018-12-03 RX ORDER — LABETALOL 100 MG/1
TABLET, FILM COATED ORAL
Qty: 180 TABLET | Refills: 0 | Status: SHIPPED | OUTPATIENT
Start: 2018-12-03 | End: 2019-03-04

## 2019-02-07 ENCOUNTER — LABORATORY ENCOUNTER (OUTPATIENT)
Dept: LAB | Age: 49
End: 2019-02-07
Attending: OBSTETRICS & GYNECOLOGY
Payer: COMMERCIAL

## 2019-02-07 DIAGNOSIS — R53.83 FATIGUE, UNSPECIFIED TYPE: ICD-10-CM

## 2019-02-07 LAB
BASOPHILS # BLD AUTO: 0.08 X10(3) UL (ref 0–0.2)
BASOPHILS NFR BLD AUTO: 1.3 %
DEPRECATED RDW RBC AUTO: 43.1 FL (ref 35.1–46.3)
EOSINOPHIL # BLD AUTO: 0.24 X10(3) UL (ref 0–0.7)
EOSINOPHIL NFR BLD AUTO: 3.9 %
ERYTHROCYTE [DISTWIDTH] IN BLOOD BY AUTOMATED COUNT: 12.8 % (ref 11–15)
EST. AVERAGE GLUCOSE BLD GHB EST-MCNC: 143 MG/DL (ref 68–126)
HBA1C MFR BLD HPLC: 6.6 % (ref ?–5.7)
HCT VFR BLD AUTO: 38.1 % (ref 35–48)
HGB BLD-MCNC: 12.8 G/DL (ref 12–16)
IMM GRANULOCYTES # BLD AUTO: 0.02 X10(3) UL (ref 0–1)
IMM GRANULOCYTES NFR BLD: 0.3 %
LYMPHOCYTES # BLD AUTO: 1.73 X10(3) UL (ref 1–4)
LYMPHOCYTES NFR BLD AUTO: 28 %
MCH RBC QN AUTO: 30.9 PG (ref 26–34)
MCHC RBC AUTO-ENTMCNC: 33.6 G/DL (ref 31–37)
MCV RBC AUTO: 92 FL (ref 80–100)
MONOCYTES # BLD AUTO: 0.46 X10(3) UL (ref 0.1–1)
MONOCYTES NFR BLD AUTO: 7.4 %
NEUTROPHILS # BLD AUTO: 3.65 X10 (3) UL (ref 1.5–7.7)
NEUTROPHILS # BLD AUTO: 3.65 X10(3) UL (ref 1.5–7.7)
NEUTROPHILS NFR BLD AUTO: 59.1 %
PLATELET # BLD AUTO: 264 10(3)UL (ref 150–450)
RBC # BLD AUTO: 4.14 X10(6)UL (ref 3.8–5.3)
TSI SER-ACNC: 1.87 MIU/ML (ref 0.35–5.5)
WBC # BLD AUTO: 6.2 X10(3) UL (ref 4–11)

## 2019-02-07 PROCEDURE — 83036 HEMOGLOBIN GLYCOSYLATED A1C: CPT

## 2019-02-07 PROCEDURE — 36415 COLL VENOUS BLD VENIPUNCTURE: CPT

## 2019-02-07 PROCEDURE — 85025 COMPLETE CBC W/AUTO DIFF WBC: CPT

## 2019-02-07 PROCEDURE — 84443 ASSAY THYROID STIM HORMONE: CPT

## 2019-02-07 NOTE — TELEPHONE ENCOUNTER
Last OV relevant to medication: 7/17/18  Last refill date: 11/12/18  #/refills: 60/0  When pt was asked to return for OV: 6 months  Upcoming appt/reason: None    Spoke to patient via email, she stated she had left over prescription from [facility] to

## 2019-02-07 NOTE — TELEPHONE ENCOUNTER
Needs appt  A1C drawn by her gyne shows DM now  Needs extended appt w/me    Also need to change 1 month Rx to local pharmacy

## 2019-02-08 NOTE — TELEPHONE ENCOUNTER
[employer] message sent patient to notify her that her that 30 days worth of Metformin was sent to local pharmacy and that she needs to schedule an appointment. Also notified that her latest a1c did indicate DM.  Then called [facility] and they stated the

## 2019-02-18 ENCOUNTER — MED REC SCAN ONLY (OUTPATIENT)
Dept: INTERNAL MEDICINE CLINIC | Facility: CLINIC | Age: 49
End: 2019-02-18

## 2019-02-25 ENCOUNTER — TELEPHONE (OUTPATIENT)
Dept: INTERNAL MEDICINE CLINIC | Facility: CLINIC | Age: 49
End: 2019-02-25

## 2019-02-25 NOTE — TELEPHONE ENCOUNTER
Incoming (mail or fax): Fax  Received from:  [id number]   Documentation given to: [employer])

## 2019-03-03 RX ORDER — ATORVASTATIN CALCIUM 40 MG/1
TABLET, FILM COATED ORAL
Qty: 90 TABLET | Refills: 0 | Status: CANCELLED | OUTPATIENT
Start: 2019-03-03

## 2019-03-03 RX ORDER — LABETALOL 100 MG/1
TABLET, FILM COATED ORAL
Qty: 180 TABLET | Refills: 0 | Status: CANCELLED | OUTPATIENT
Start: 2019-03-03

## 2019-03-03 RX ORDER — LISINOPRIL AND HYDROCHLOROTHIAZIDE 20; 12.5 MG/1; MG/1
TABLET ORAL
Qty: 90 TABLET | Refills: 0 | Status: CANCELLED | OUTPATIENT
Start: 2019-03-03

## 2019-03-04 ENCOUNTER — OFFICE VISIT (OUTPATIENT)
Dept: INTERNAL MEDICINE CLINIC | Facility: CLINIC | Age: 49
End: 2019-03-04
Payer: COMMERCIAL

## 2019-03-04 VITALS
TEMPERATURE: 98 F | DIASTOLIC BLOOD PRESSURE: 76 MMHG | SYSTOLIC BLOOD PRESSURE: 124 MMHG | WEIGHT: 194 LBS | OXYGEN SATURATION: 98 % | HEART RATE: 68 BPM | RESPIRATION RATE: 16 BRPM | BODY MASS INDEX: 33.12 KG/M2 | HEIGHT: 64 IN

## 2019-03-04 DIAGNOSIS — J45.20 MILD INTERMITTENT ASTHMA WITHOUT COMPLICATION: ICD-10-CM

## 2019-03-04 DIAGNOSIS — E78.00 PURE HYPERCHOLESTEROLEMIA: ICD-10-CM

## 2019-03-04 DIAGNOSIS — I10 ESSENTIAL HYPERTENSION: ICD-10-CM

## 2019-03-04 DIAGNOSIS — R73.02 IGT (IMPAIRED GLUCOSE TOLERANCE): Primary | ICD-10-CM

## 2019-03-04 PROCEDURE — 99214 OFFICE O/P EST MOD 30 MIN: CPT | Performed by: INTERNAL MEDICINE

## 2019-03-04 RX ORDER — ALBUTEROL SULFATE 90 UG/1
1-2 AEROSOL, METERED RESPIRATORY (INHALATION) EVERY 4 HOURS PRN
Qty: 1 INHALER | Refills: 1 | Status: SHIPPED | OUTPATIENT
Start: 2019-03-04 | End: 2020-10-29

## 2019-03-04 RX ORDER — ATORVASTATIN CALCIUM 40 MG/1
40 TABLET, FILM COATED ORAL
Qty: 90 TABLET | Refills: 1 | Status: SHIPPED | OUTPATIENT
Start: 2019-03-04 | End: 2019-08-30

## 2019-03-04 RX ORDER — LABETALOL 100 MG/1
100 TABLET, FILM COATED ORAL 2 TIMES DAILY
Qty: 180 TABLET | Refills: 1 | Status: SHIPPED | OUTPATIENT
Start: 2019-03-04 | End: 2019-08-30

## 2019-03-04 RX ORDER — LISINOPRIL AND HYDROCHLOROTHIAZIDE 20; 12.5 MG/1; MG/1
1 TABLET ORAL
Qty: 90 TABLET | Refills: 1 | Status: SHIPPED | OUTPATIENT
Start: 2019-03-04 | End: 2019-08-30

## 2019-03-04 NOTE — PROGRESS NOTES
Dionte Canales is a 50year old female. To F/U from last visit regarding IGT/HTN and high cholesterol and asthma  HPI:     Interim history:her A1C went up and she immediately began treadmill 4 days weekly and WW and has lost 10 lb since feb11.  She is f tobacco: Never Used      Tobacco comment: Noted 2/20/2012: \"1/2 pk/day\"    Alcohol use:  Yes      Alcohol/week: 0.0 - 0.6 oz      Comment: 2-3 glass of wine on the weekends    Drug use: No     Patient Active Problem List:     HTN (hypertension)     Hyperl taken great action w/new lifestyle measures, we will keep everything the same, won't change any diagnoses at this time and recheck labs 3 mos- may be aggravated by her prempro  Her ultimate goal weight it 145lb  Essential hypertension- at goal, CPM  Pure h

## 2019-09-01 DIAGNOSIS — R73.02 IGT (IMPAIRED GLUCOSE TOLERANCE): Primary | ICD-10-CM

## 2019-09-03 RX ORDER — BLOOD-GLUCOSE METER
KIT MISCELLANEOUS
Qty: 100 STRIP | Refills: 0 | Status: SHIPPED | OUTPATIENT
Start: 2019-09-03 | End: 2019-12-01

## 2019-09-04 RX ORDER — LABETALOL 100 MG/1
TABLET, FILM COATED ORAL
Qty: 60 TABLET | Refills: 0 | Status: SHIPPED | OUTPATIENT
Start: 2019-09-04 | End: 2019-10-14

## 2019-09-04 RX ORDER — ATORVASTATIN CALCIUM 40 MG/1
TABLET, FILM COATED ORAL
Qty: 30 TABLET | Refills: 0 | Status: SHIPPED | OUTPATIENT
Start: 2019-09-04 | End: 2019-10-14

## 2019-09-04 RX ORDER — LISINOPRIL AND HYDROCHLOROTHIAZIDE 20; 12.5 MG/1; MG/1
TABLET ORAL
Qty: 30 TABLET | Refills: 0 | Status: SHIPPED | OUTPATIENT
Start: 2019-09-04 | End: 2019-10-14

## 2019-10-09 ENCOUNTER — LAB ENCOUNTER (OUTPATIENT)
Dept: LAB | Age: 49
End: 2019-10-09
Attending: INTERNAL MEDICINE
Payer: COMMERCIAL

## 2019-10-09 DIAGNOSIS — R73.02 IGT (IMPAIRED GLUCOSE TOLERANCE): ICD-10-CM

## 2019-10-09 PROCEDURE — 80053 COMPREHEN METABOLIC PANEL: CPT | Performed by: INTERNAL MEDICINE

## 2019-10-09 PROCEDURE — 80061 LIPID PANEL: CPT | Performed by: INTERNAL MEDICINE

## 2019-10-09 PROCEDURE — 83036 HEMOGLOBIN GLYCOSYLATED A1C: CPT | Performed by: INTERNAL MEDICINE

## 2019-10-09 PROCEDURE — 36415 COLL VENOUS BLD VENIPUNCTURE: CPT | Performed by: INTERNAL MEDICINE

## 2019-10-14 ENCOUNTER — OFFICE VISIT (OUTPATIENT)
Dept: INTERNAL MEDICINE CLINIC | Facility: CLINIC | Age: 49
End: 2019-10-14
Payer: COMMERCIAL

## 2019-10-14 VITALS
SYSTOLIC BLOOD PRESSURE: 126 MMHG | DIASTOLIC BLOOD PRESSURE: 80 MMHG | OXYGEN SATURATION: 99 % | HEIGHT: 64 IN | WEIGHT: 191.81 LBS | BODY MASS INDEX: 32.74 KG/M2 | HEART RATE: 64 BPM | RESPIRATION RATE: 14 BRPM

## 2019-10-14 DIAGNOSIS — E78.00 PURE HYPERCHOLESTEROLEMIA: ICD-10-CM

## 2019-10-14 DIAGNOSIS — I10 ESSENTIAL HYPERTENSION: ICD-10-CM

## 2019-10-14 DIAGNOSIS — E66.09 CLASS 1 OBESITY DUE TO EXCESS CALORIES WITHOUT SERIOUS COMORBIDITY WITH BODY MASS INDEX (BMI) OF 32.0 TO 32.9 IN ADULT: ICD-10-CM

## 2019-10-14 DIAGNOSIS — R73.02 IGT (IMPAIRED GLUCOSE TOLERANCE): Primary | ICD-10-CM

## 2019-10-14 PROCEDURE — 99214 OFFICE O/P EST MOD 30 MIN: CPT | Performed by: INTERNAL MEDICINE

## 2019-10-14 RX ORDER — ATORVASTATIN CALCIUM 40 MG/1
40 TABLET, FILM COATED ORAL
Qty: 90 TABLET | Refills: 1 | Status: SHIPPED | OUTPATIENT
Start: 2019-10-14 | End: 2020-04-13

## 2019-10-14 RX ORDER — LISINOPRIL AND HYDROCHLOROTHIAZIDE 20; 12.5 MG/1; MG/1
1 TABLET ORAL
Qty: 90 TABLET | Refills: 1 | Status: SHIPPED | OUTPATIENT
Start: 2019-10-14 | End: 2020-04-13

## 2019-10-14 RX ORDER — LABETALOL 100 MG/1
100 TABLET, FILM COATED ORAL 2 TIMES DAILY
Qty: 180 TABLET | Refills: 1 | Status: SHIPPED | OUTPATIENT
Start: 2019-10-14 | End: 2020-04-13

## 2019-10-14 NOTE — PATIENT INSTRUCTIONS
Start metamucil fiber supplement  Daily  Start w/1/2 dose then incerase gradually to full dose  Drink lots of water throughout the day

## 2019-10-14 NOTE — PROGRESS NOTES
Emily Briceno is a 52year old female. To F/U from last visit regarding IGT, HTN and DL  HPI:    Interim history:    She stopped [employer] because she \"didin't like their program, didn't stop her from eating too many calories of the allowable foods\".  Has n date: 2012        Years since quittin.2      Smokeless tobacco: Never Used      Tobacco comment: Noted 2012: \"1/2 pk/day\"    Alcohol use:  Yes      Alcohol/week: 0.0 - 1.0 standard drinks      Comment: 2-3 glass of wine on the weekends     - 2.0         ASSESSMENT AND PLAN:   Igt (impaired glucose tolerance)  (primary encounter diagnosis)-A1C came down, repeat 6 mos  Essential hypertension- at goal, CPM  Pure hypercholesterolemia- on statin, CPM  Class 1 obesity due to excess calories withou

## 2019-12-01 DIAGNOSIS — R73.02 IGT (IMPAIRED GLUCOSE TOLERANCE): ICD-10-CM

## 2019-12-03 RX ORDER — BLOOD-GLUCOSE METER
KIT MISCELLANEOUS
Qty: 100 STRIP | Refills: 0 | Status: SHIPPED | OUTPATIENT
Start: 2019-12-03 | End: 2020-03-04

## 2020-02-03 ENCOUNTER — HOSPITAL ENCOUNTER (OUTPATIENT)
Age: 50
Discharge: HOME | End: 2020-02-03
Payer: COMMERCIAL

## 2020-02-03 VITALS
HEIGHT: 64 IN | SYSTOLIC BLOOD PRESSURE: 144 MMHG | BODY MASS INDEX: 31.58 KG/M2 | HEART RATE: 72 BPM | RESPIRATION RATE: 16 BRPM | DIASTOLIC BLOOD PRESSURE: 91 MMHG | TEMPERATURE: 98 F | WEIGHT: 185 LBS | OXYGEN SATURATION: 98 %

## 2020-02-03 DIAGNOSIS — J01.40 ACUTE NON-RECURRENT PANSINUSITIS: Primary | ICD-10-CM

## 2020-02-03 LAB
POCT INFLUENZA A: NEGATIVE
POCT INFLUENZA B: NEGATIVE

## 2020-02-03 PROCEDURE — 99214 OFFICE O/P EST MOD 30 MIN: CPT

## 2020-02-03 PROCEDURE — 87502 INFLUENZA DNA AMP PROBE: CPT | Performed by: PHYSICIAN ASSISTANT

## 2020-02-03 PROCEDURE — 99213 OFFICE O/P EST LOW 20 MIN: CPT

## 2020-02-03 RX ORDER — IBUPROFEN 600 MG/1
600 TABLET ORAL ONCE
Status: COMPLETED | OUTPATIENT
Start: 2020-02-03 | End: 2020-02-03

## 2020-02-03 RX ORDER — CEFDINIR 300 MG/1
300 CAPSULE ORAL 2 TIMES DAILY
Qty: 20 CAPSULE | Refills: 0 | Status: SHIPPED | OUTPATIENT
Start: 2020-02-03 | End: 2020-02-13

## 2020-02-03 NOTE — ED PROVIDER NOTES
Patient Seen in: [facility]      History   Patient presents with:  Cough/URI    Stated Complaint: 4 days sinus issues    HPI    Jayleen Walsh is a 44-year-old female presents today for evaluation of nasal congestion, body aches and gene All other systems reviewed and negative except as noted above.     Physical Exam     ED Triage Vitals [02/03/20 1036]   BP (!) 144/91   Pulse 72   Resp 16   Temp 97.9 °F (36.6 °C)   Temp src Temporal   SpO2 98 %   O2 Device None (Room air)       Current adverse reaction to this and has no follow-up notes and her electronic medical record regarding any adverse effects. The patient is encouraged to return if any concerning symptoms arise. Additional verbal discharge instructions are given and discussedRolando RASMUSSEN

## 2020-03-03 DIAGNOSIS — R73.02 IGT (IMPAIRED GLUCOSE TOLERANCE): ICD-10-CM

## 2020-03-04 RX ORDER — BLOOD-GLUCOSE METER
KIT MISCELLANEOUS
Qty: 100 STRIP | Refills: 0 | Status: SHIPPED | OUTPATIENT
Start: 2020-03-04 | End: 2020-06-03

## 2020-04-13 RX ORDER — LISINOPRIL AND HYDROCHLOROTHIAZIDE 20; 12.5 MG/1; MG/1
TABLET ORAL
Qty: 90 TABLET | Refills: 0 | Status: SHIPPED | OUTPATIENT
Start: 2020-04-13 | End: 2020-07-10

## 2020-04-13 RX ORDER — ATORVASTATIN CALCIUM 40 MG/1
TABLET, FILM COATED ORAL
Qty: 90 TABLET | Refills: 0 | Status: SHIPPED | OUTPATIENT
Start: 2020-04-13 | End: 2020-07-10

## 2020-04-13 RX ORDER — LABETALOL 100 MG/1
TABLET, FILM COATED ORAL
Qty: 180 TABLET | Refills: 0 | Status: SHIPPED | OUTPATIENT
Start: 2020-04-13 | End: 2020-07-10

## 2020-04-13 NOTE — TELEPHONE ENCOUNTER
Last OV relevant to medication: 10/14/19  Last refill date:10/14/19    #180/refills:1  When pt was asked to return for OV:6 months  Upcoming appt/reason: 4/14/2020  Didn't pass protocol due to date of last A1C.     Lab Results   Component Value Date    EAG

## 2020-06-03 DIAGNOSIS — R73.02 IGT (IMPAIRED GLUCOSE TOLERANCE): ICD-10-CM

## 2020-06-03 RX ORDER — BLOOD-GLUCOSE METER
KIT MISCELLANEOUS
Qty: 100 STRIP | Refills: 3 | Status: SHIPPED | OUTPATIENT
Start: 2020-06-03 | End: 2021-06-01

## 2020-06-15 RX ORDER — ALBUTEROL SULFATE 90 UG/1
AEROSOL, METERED RESPIRATORY (INHALATION)
Qty: 8.5 G | Refills: 2 | OUTPATIENT
Start: 2020-06-15

## 2020-07-09 DIAGNOSIS — I10 ESSENTIAL HYPERTENSION: Primary | ICD-10-CM

## 2020-07-09 DIAGNOSIS — E78.00 PURE HYPERCHOLESTEROLEMIA: ICD-10-CM

## 2020-07-10 RX ORDER — ATORVASTATIN CALCIUM 40 MG/1
TABLET, FILM COATED ORAL
Qty: 90 TABLET | Refills: 0 | Status: SHIPPED | OUTPATIENT
Start: 2020-07-10 | End: 2020-10-12

## 2020-07-10 RX ORDER — LABETALOL 100 MG/1
TABLET, FILM COATED ORAL
Qty: 180 TABLET | Refills: 0 | Status: SHIPPED | OUTPATIENT
Start: 2020-07-10 | End: 2020-10-12

## 2020-07-10 RX ORDER — LISINOPRIL AND HYDROCHLOROTHIAZIDE 20; 12.5 MG/1; MG/1
TABLET ORAL
Qty: 90 TABLET | Refills: 0 | Status: SHIPPED | OUTPATIENT
Start: 2020-07-10 | End: 2020-10-12

## 2020-10-07 DIAGNOSIS — E78.00 PURE HYPERCHOLESTEROLEMIA: ICD-10-CM

## 2020-10-07 DIAGNOSIS — I10 ESSENTIAL HYPERTENSION: ICD-10-CM

## 2020-10-12 ENCOUNTER — TELEPHONE (OUTPATIENT)
Dept: INTERNAL MEDICINE CLINIC | Facility: CLINIC | Age: 50
End: 2020-10-12

## 2020-10-12 DIAGNOSIS — R73.02 IGT (IMPAIRED GLUCOSE TOLERANCE): ICD-10-CM

## 2020-10-12 DIAGNOSIS — Z00.00 ENCOUNTER FOR ANNUAL PHYSICAL EXAM: Primary | ICD-10-CM

## 2020-10-12 DIAGNOSIS — E78.00 PURE HYPERCHOLESTEROLEMIA: ICD-10-CM

## 2020-10-12 DIAGNOSIS — I10 ESSENTIAL HYPERTENSION: ICD-10-CM

## 2020-10-12 RX ORDER — ATORVASTATIN CALCIUM 40 MG/1
TABLET, FILM COATED ORAL
Qty: 90 TABLET | Refills: 0 | Status: SHIPPED | OUTPATIENT
Start: 2020-10-12 | End: 2021-01-06

## 2020-10-12 NOTE — TELEPHONE ENCOUNTER
Pt calling and is due for a follow up for multiple issues, pt labs epire 10/14 and she will be unable to do them bore then.  Can pt get a new order, pt also scheduled appt for for 10/29/20 and willl get labs done before hand

## 2020-10-13 NOTE — TELEPHONE ENCOUNTER
Patient needs to schedule PE after the visit on 10/29/2020      Last OV relevant to medication: 10/14/2019, multiple issues  Last refill date: 7/15/2020     #/refills: 180-0  When pt was asked to return for OV: 6 months  Upcoming appt/reason: 10/29/2020 f

## 2020-10-14 RX ORDER — LABETALOL 100 MG/1
TABLET, FILM COATED ORAL
Qty: 180 TABLET | Refills: 0 | Status: SHIPPED | OUTPATIENT
Start: 2020-10-14 | End: 2020-10-29

## 2020-10-14 RX ORDER — LISINOPRIL AND HYDROCHLOROTHIAZIDE 20; 12.5 MG/1; MG/1
TABLET ORAL
Qty: 90 TABLET | Refills: 0 | Status: SHIPPED | OUTPATIENT
Start: 2020-10-14 | End: 2021-01-06

## 2020-10-14 NOTE — TELEPHONE ENCOUNTER
Last OV relevant to medication: 10/14/2019.  Multiple issues  Last refill date: 7/10/2020   #/refills: 180-0, 90-0  When pt was asked to return for OV: 4 months  Upcoming appt/reason: 10/29/2020    Lab Results   Component Value Date    WBC 6.2 02/07/2019

## 2020-10-23 ENCOUNTER — LAB ENCOUNTER (OUTPATIENT)
Dept: LAB | Age: 50
End: 2020-10-23
Attending: INTERNAL MEDICINE
Payer: COMMERCIAL

## 2020-10-23 DIAGNOSIS — R73.02 IGT (IMPAIRED GLUCOSE TOLERANCE): ICD-10-CM

## 2020-10-23 DIAGNOSIS — E78.00 PURE HYPERCHOLESTEROLEMIA: ICD-10-CM

## 2020-10-23 DIAGNOSIS — Z00.00 ENCOUNTER FOR ANNUAL PHYSICAL EXAM: ICD-10-CM

## 2020-10-23 DIAGNOSIS — I10 ESSENTIAL HYPERTENSION: ICD-10-CM

## 2020-10-23 PROCEDURE — 80061 LIPID PANEL: CPT | Performed by: INTERNAL MEDICINE

## 2020-10-23 PROCEDURE — 80050 GENERAL HEALTH PANEL: CPT | Performed by: INTERNAL MEDICINE

## 2020-10-23 PROCEDURE — 85025 COMPLETE CBC W/AUTO DIFF WBC: CPT | Performed by: INTERNAL MEDICINE

## 2020-10-23 PROCEDURE — 36415 COLL VENOUS BLD VENIPUNCTURE: CPT | Performed by: INTERNAL MEDICINE

## 2020-10-23 PROCEDURE — 83036 HEMOGLOBIN GLYCOSYLATED A1C: CPT | Performed by: INTERNAL MEDICINE

## 2020-10-29 ENCOUNTER — OFFICE VISIT (OUTPATIENT)
Dept: INTERNAL MEDICINE CLINIC | Facility: CLINIC | Age: 50
End: 2020-10-29
Payer: COMMERCIAL

## 2020-10-29 VITALS
HEART RATE: 62 BPM | DIASTOLIC BLOOD PRESSURE: 70 MMHG | SYSTOLIC BLOOD PRESSURE: 114 MMHG | BODY MASS INDEX: 32.5 KG/M2 | HEIGHT: 64 IN | WEIGHT: 190.38 LBS | TEMPERATURE: 98 F | OXYGEN SATURATION: 97 % | RESPIRATION RATE: 14 BRPM

## 2020-10-29 DIAGNOSIS — I10 ESSENTIAL HYPERTENSION: ICD-10-CM

## 2020-10-29 DIAGNOSIS — E78.00 PURE HYPERCHOLESTEROLEMIA: ICD-10-CM

## 2020-10-29 DIAGNOSIS — J45.20 MILD INTERMITTENT ASTHMA WITHOUT COMPLICATION: ICD-10-CM

## 2020-10-29 DIAGNOSIS — R73.02 IGT (IMPAIRED GLUCOSE TOLERANCE): Primary | ICD-10-CM

## 2020-10-29 PROCEDURE — 3074F SYST BP LT 130 MM HG: CPT | Performed by: INTERNAL MEDICINE

## 2020-10-29 PROCEDURE — 3078F DIAST BP <80 MM HG: CPT | Performed by: INTERNAL MEDICINE

## 2020-10-29 PROCEDURE — 3008F BODY MASS INDEX DOCD: CPT | Performed by: INTERNAL MEDICINE

## 2020-10-29 PROCEDURE — 99214 OFFICE O/P EST MOD 30 MIN: CPT | Performed by: INTERNAL MEDICINE

## 2020-10-29 RX ORDER — METFORMIN HYDROCHLORIDE 500 MG/1
500 TABLET, EXTENDED RELEASE ORAL
Qty: 30 TABLET | Refills: 0 | Status: SHIPPED | OUTPATIENT
Start: 2020-10-29 | End: 2020-11-12 | Stop reason: ALTCHOICE

## 2020-10-29 RX ORDER — ALBUTEROL SULFATE 90 UG/1
1-2 AEROSOL, METERED RESPIRATORY (INHALATION) EVERY 4 HOURS PRN
Qty: 1 INHALER | Refills: 0 | Status: SHIPPED | OUTPATIENT
Start: 2020-10-29 | End: 2021-10-29

## 2020-10-29 RX ORDER — METOPROLOL SUCCINATE 50 MG/1
50 TABLET, EXTENDED RELEASE ORAL DAILY
Qty: 90 TABLET | Refills: 0 | Status: SHIPPED | OUTPATIENT
Start: 2020-10-29 | End: 2021-01-11

## 2020-10-29 RX ORDER — FLUTICASONE PROPIONATE 50 MCG
2 SPRAY, SUSPENSION (ML) NASAL
COMMUNITY
End: 2020-10-29

## 2020-10-29 RX ORDER — FLUTICASONE PROPIONATE 50 MCG
2 SPRAY, SUSPENSION (ML) NASAL
Qty: 3 BOTTLE | Refills: 3 | Status: SHIPPED | OUTPATIENT
Start: 2020-10-29 | End: 2021-10-29

## 2020-10-29 NOTE — PROGRESS NOTES
Michelle Chaney is a 48year old female. To F/U from last visit regarding IGT, asthma, htn and high cholesterol   HPI:    Interim history:she is feeling fine.  She is tolerating medications  Statin:denies jaundice, pale stools, dark urine, weight change, tobacco: Never Used      Tobacco comment: Noted 2/20/2012: \"1/2 pk/day\"    Alcohol use:  Yes      Alcohol/week: 0.0 - 1.0 standard drinks      Comment: 2-3 glass of wine on the weekends    Drug use: No     Patient Active Problem List:     HTN (hypertensio - 5.0 g/dL    Globulin  3.0 2.8 - 4.4 g/dL    A/G Ratio 1.2 1.0 - 2.0    FASTING Yes    TSH W REFLEX TO FREE T4   Result Value Ref Range    TSH 3.740 0.358 - 3.740 mIU/mL   CBC W/ DIFFERENTIAL   Result Value Ref Range    WBC 5.9 4.0 - 11.0 x10(3) uL    RBC for Rhinitis. • Metoprolol Succinate ER 50 MG Oral Tablet 24 Hr 90 tablet 0     Sig: Take 1 tablet (50 mg total) by mouth daily.    • metFORMIN HCl  MG Oral Tablet 24 Hr 30 tablet 0     Sig: Take 1 tablet (500 mg total) by mouth daily with breakfast

## 2020-11-12 ENCOUNTER — PATIENT MESSAGE (OUTPATIENT)
Dept: INTERNAL MEDICINE CLINIC | Facility: CLINIC | Age: 50
End: 2020-11-12

## 2020-11-12 NOTE — TELEPHONE ENCOUNTER
From: Iliana Santiago  To: Sara Hook MD  Sent: 11/12/2020 1:26 PM CST  Subject: Prescription Question    Hi, Dr. Tyler Cleaning! I've been taking the extended release Metformin for now for about two weeks and I don't think it's working.  My fasting b

## 2021-01-06 DIAGNOSIS — E78.00 PURE HYPERCHOLESTEROLEMIA: ICD-10-CM

## 2021-01-06 DIAGNOSIS — I10 ESSENTIAL HYPERTENSION: ICD-10-CM

## 2021-01-06 RX ORDER — LABETALOL 100 MG/1
TABLET, FILM COATED ORAL
Qty: 180 TABLET | Refills: 3 | OUTPATIENT
Start: 2021-01-06

## 2021-01-06 RX ORDER — LISINOPRIL AND HYDROCHLOROTHIAZIDE 20; 12.5 MG/1; MG/1
TABLET ORAL
Qty: 90 TABLET | Refills: 2 | Status: SHIPPED | OUTPATIENT
Start: 2021-01-06 | End: 2021-08-31

## 2021-01-06 RX ORDER — ATORVASTATIN CALCIUM 40 MG/1
TABLET, FILM COATED ORAL
Qty: 90 TABLET | Refills: 2 | Status: SHIPPED | OUTPATIENT
Start: 2021-01-06 | End: 2021-08-31

## 2021-01-08 DIAGNOSIS — I10 ESSENTIAL HYPERTENSION: Primary | ICD-10-CM

## 2021-01-11 RX ORDER — METOPROLOL SUCCINATE 50 MG/1
TABLET, EXTENDED RELEASE ORAL
Qty: 90 TABLET | Refills: 0 | Status: SHIPPED | OUTPATIENT
Start: 2021-01-11 | End: 2021-03-02

## 2021-02-24 ENCOUNTER — PATIENT MESSAGE (OUTPATIENT)
Dept: INTERNAL MEDICINE CLINIC | Facility: CLINIC | Age: 51
End: 2021-02-24

## 2021-02-24 DIAGNOSIS — I10 ESSENTIAL HYPERTENSION: Primary | ICD-10-CM

## 2021-02-24 NOTE — TELEPHONE ENCOUNTER
From: Abdifatah Hernandez  To: Luma Carmona MD  Sent: 2/24/2021 3:50 PM CST  Subject: Prescription Question    Hi, Dr. Deni Houser! Hope you're doing well!  I've noticed since I went off the lebetalol twice a day and started on the metrprolol, I've been

## 2021-02-24 NOTE — TELEPHONE ENCOUNTER
Please see message from patient. Patient started on metoprolol 1/2021. She is experiencing no shortness of breath. Just swelling throughout the day. Patient is asking if she can go back on the labetolol. 100mg BID.  Labetolol was discontinued and metopr

## 2021-02-25 RX ORDER — LABETALOL 100 MG/1
100 TABLET, FILM COATED ORAL 2 TIMES DAILY
Qty: 60 TABLET | Refills: 3 | Status: SHIPPED | OUTPATIENT
Start: 2021-02-25 | End: 2021-03-02

## 2021-05-28 DIAGNOSIS — R73.02 IGT (IMPAIRED GLUCOSE TOLERANCE): ICD-10-CM

## 2021-06-01 RX ORDER — BLOOD-GLUCOSE METER
KIT MISCELLANEOUS
Qty: 100 STRIP | Refills: 1 | Status: SHIPPED | OUTPATIENT
Start: 2021-06-01 | End: 2021-10-29

## 2021-08-30 DIAGNOSIS — I10 ESSENTIAL HYPERTENSION: ICD-10-CM

## 2021-08-30 DIAGNOSIS — E78.00 PURE HYPERCHOLESTEROLEMIA: ICD-10-CM

## 2021-08-31 ENCOUNTER — TELEPHONE (OUTPATIENT)
Dept: INTERNAL MEDICINE CLINIC | Facility: CLINIC | Age: 51
End: 2021-08-31

## 2021-08-31 DIAGNOSIS — I10 ESSENTIAL HYPERTENSION: ICD-10-CM

## 2021-08-31 DIAGNOSIS — E78.00 PURE HYPERCHOLESTEROLEMIA: ICD-10-CM

## 2021-08-31 DIAGNOSIS — Z00.00 ROUTINE GENERAL MEDICAL EXAMINATION AT A HEALTH CARE FACILITY: Primary | ICD-10-CM

## 2021-08-31 DIAGNOSIS — R73.02 IGT (IMPAIRED GLUCOSE TOLERANCE): ICD-10-CM

## 2021-08-31 RX ORDER — LISINOPRIL AND HYDROCHLOROTHIAZIDE 20; 12.5 MG/1; MG/1
TABLET ORAL
Qty: 90 TABLET | Refills: 0 | Status: SHIPPED | OUTPATIENT
Start: 2021-08-31 | End: 2021-10-29

## 2021-08-31 RX ORDER — ATORVASTATIN CALCIUM 40 MG/1
TABLET, FILM COATED ORAL
Qty: 90 TABLET | Refills: 0 | Status: SHIPPED | OUTPATIENT
Start: 2021-08-31 | End: 2021-10-29

## 2021-08-31 NOTE — TELEPHONE ENCOUNTER
Pt requesting labs in advance of her PE. Do you want same labs as last year which included A1c (labs from 10/23/21)? thanks!

## 2021-08-31 NOTE — TELEPHONE ENCOUNTER
Atorvastatin passes protocol, BP med does not.      Last OV relevant to medication: 10/29/2020  Atorvastatin: Last refill date: 1/6/21     #/refills: 90/2  Lisinopril-hydrochlorothiazide: Last refill date: 1/6/21     #/refills: 90/2  When pt was asked to re

## 2021-08-31 NOTE — TELEPHONE ENCOUNTER
Pt scheduled a PE with  10/28/21 and would like her blood work ordered before     Please advise    Thank you

## 2021-10-21 ENCOUNTER — LAB ENCOUNTER (OUTPATIENT)
Dept: LAB | Age: 51
End: 2021-10-21
Attending: INTERNAL MEDICINE
Payer: COMMERCIAL

## 2021-10-21 DIAGNOSIS — R73.02 IGT (IMPAIRED GLUCOSE TOLERANCE): ICD-10-CM

## 2021-10-21 DIAGNOSIS — E78.00 PURE HYPERCHOLESTEROLEMIA: ICD-10-CM

## 2021-10-21 DIAGNOSIS — I10 ESSENTIAL HYPERTENSION: ICD-10-CM

## 2021-10-21 DIAGNOSIS — Z00.00 ROUTINE GENERAL MEDICAL EXAMINATION AT A HEALTH CARE FACILITY: ICD-10-CM

## 2021-10-21 PROCEDURE — 83036 HEMOGLOBIN GLYCOSYLATED A1C: CPT

## 2021-10-21 PROCEDURE — 80061 LIPID PANEL: CPT

## 2021-10-21 PROCEDURE — 84443 ASSAY THYROID STIM HORMONE: CPT

## 2021-10-21 PROCEDURE — 80053 COMPREHEN METABOLIC PANEL: CPT

## 2021-10-21 PROCEDURE — 85025 COMPLETE CBC W/AUTO DIFF WBC: CPT

## 2021-10-21 PROCEDURE — 36415 COLL VENOUS BLD VENIPUNCTURE: CPT

## 2021-10-28 ENCOUNTER — OFFICE VISIT (OUTPATIENT)
Dept: INTERNAL MEDICINE CLINIC | Facility: CLINIC | Age: 51
End: 2021-10-28
Payer: COMMERCIAL

## 2021-10-28 ENCOUNTER — PATIENT MESSAGE (OUTPATIENT)
Dept: INTERNAL MEDICINE CLINIC | Facility: CLINIC | Age: 51
End: 2021-10-28

## 2021-10-28 VITALS
WEIGHT: 194.63 LBS | OXYGEN SATURATION: 97 % | HEIGHT: 63.78 IN | SYSTOLIC BLOOD PRESSURE: 122 MMHG | TEMPERATURE: 98 F | RESPIRATION RATE: 16 BRPM | DIASTOLIC BLOOD PRESSURE: 78 MMHG | HEART RATE: 70 BPM | BODY MASS INDEX: 33.64 KG/M2

## 2021-10-28 DIAGNOSIS — I10 PRIMARY HYPERTENSION: ICD-10-CM

## 2021-10-28 DIAGNOSIS — R73.02 IGT (IMPAIRED GLUCOSE TOLERANCE): ICD-10-CM

## 2021-10-28 DIAGNOSIS — J30.2 SEASONAL ALLERGIES: Primary | ICD-10-CM

## 2021-10-28 DIAGNOSIS — E78.00 PURE HYPERCHOLESTEROLEMIA: ICD-10-CM

## 2021-10-28 DIAGNOSIS — J45.20 MILD INTERMITTENT ASTHMA WITHOUT COMPLICATION: ICD-10-CM

## 2021-10-28 DIAGNOSIS — Z12.11 COLON CANCER SCREENING: ICD-10-CM

## 2021-10-28 DIAGNOSIS — Z00.00 ROUTINE GENERAL MEDICAL EXAMINATION AT A HEALTH CARE FACILITY: Primary | ICD-10-CM

## 2021-10-28 DIAGNOSIS — E66.01 CLASS 3 SEVERE OBESITY DUE TO EXCESS CALORIES WITH SERIOUS COMORBIDITY AND BODY MASS INDEX (BMI) OF 50.0 TO 59.9 IN ADULT (HCC): ICD-10-CM

## 2021-10-28 DIAGNOSIS — Z23 NEED FOR VACCINATION: ICD-10-CM

## 2021-10-28 DIAGNOSIS — I10 ESSENTIAL HYPERTENSION: ICD-10-CM

## 2021-10-28 PROCEDURE — 3078F DIAST BP <80 MM HG: CPT | Performed by: INTERNAL MEDICINE

## 2021-10-28 PROCEDURE — 3008F BODY MASS INDEX DOCD: CPT | Performed by: INTERNAL MEDICINE

## 2021-10-28 PROCEDURE — 3074F SYST BP LT 130 MM HG: CPT | Performed by: INTERNAL MEDICINE

## 2021-10-28 PROCEDURE — 99396 PREV VISIT EST AGE 40-64: CPT | Performed by: INTERNAL MEDICINE

## 2021-10-28 NOTE — PROGRESS NOTES
HPI:   Doug Keys is a 46year old female who presents for a complete physical exam. .   She just saw gyne for pap  Has MG at [facility],, had in January 2021    She has HTN, DL and pre-diabetes and her A1C is up to 6.4 this year, w/a fbs 143.  She is alr apply Device Check glucose daily. 1 Device 0   • multivitamin Oral Tab Take 1 tablet by mouth daily. • Levocetirizine Dihydrochloride (XYZAL) 5 MG Oral Tab Take 1 tablet by mouth daily.               Family History   Problem Relation Age of Onset   • Ar abdominal pain denies heartburn, denies change in BM's or appetite,  :denies vaginal d/c, pelvic pain, abnormal bleeding, urinary incontinence,   MUSCULOSKELETAL: denies neck or back pain, joint pain.  Having plantar fasciitis left foot and seeing  A podi 10/21/2021    NEPRELIM 2.71 10/21/2021    NEPERCENT 51.7 10/21/2021    LYPERCENT 35.6 10/21/2021    MOPERCENT 7.2 10/21/2021    EOPERCENT 3.6 10/21/2021    BAPERCENT 1.5 10/21/2021    NE 2.71 10/21/2021    LYMABS 1.87 10/21/2021    MOABSO 0.38 10/21/2021 Primary hypertension  Plan: at goal, CPM     (E78.00) Pure hypercholesterolemia  Plan:on statin, LDL should come down w/TLC, CPM    (R73.02) IGT (impaired glucose tolerance)  Plan: COMP METABOLIC PANEL (14), LIPID PANEL,         HEMOGLOBIN A1C        She w

## 2021-10-29 RX ORDER — ALBUTEROL SULFATE 90 UG/1
1-2 AEROSOL, METERED RESPIRATORY (INHALATION) EVERY 4 HOURS PRN
Qty: 54 G | Refills: 0 | Status: SHIPPED | OUTPATIENT
Start: 2021-10-29

## 2021-10-29 RX ORDER — ATORVASTATIN CALCIUM 40 MG/1
40 TABLET, FILM COATED ORAL DAILY
Qty: 90 TABLET | Refills: 1 | Status: SHIPPED | OUTPATIENT
Start: 2021-10-29

## 2021-10-29 RX ORDER — LISINOPRIL AND HYDROCHLOROTHIAZIDE 20; 12.5 MG/1; MG/1
1 TABLET ORAL DAILY
Qty: 90 TABLET | Refills: 1 | Status: SHIPPED | OUTPATIENT
Start: 2021-10-29

## 2021-10-29 RX ORDER — LABETALOL 100 MG/1
100 TABLET, FILM COATED ORAL 2 TIMES DAILY
Qty: 180 TABLET | Refills: 3 | Status: CANCELLED | OUTPATIENT
Start: 2021-10-29

## 2021-10-29 RX ORDER — BLOOD-GLUCOSE METER
1 KIT MISCELLANEOUS DAILY
Qty: 100 STRIP | Refills: 1 | Status: SHIPPED | OUTPATIENT
Start: 2021-10-29

## 2021-10-29 RX ORDER — FLUTICASONE PROPIONATE 50 MCG
2 SPRAY, SUSPENSION (ML) NASAL
Qty: 48 G | Refills: 0 | Status: SHIPPED | OUTPATIENT
Start: 2021-10-29 | End: 2022-01-10

## 2021-10-29 NOTE — TELEPHONE ENCOUNTER
From: Enid Led  To: Sonia Kyle MD  Sent: 10/28/2021 6:07 PM CDT  Subject: Med Refills    I was in today for my annual check up and Dr. Vanna Olszewski never mentioned my medication refills.  Just want to [facility]

## 2021-10-29 NOTE — TELEPHONE ENCOUNTER
Last OV relevant to medication: 10/28/21  Last refill date: 11/12/2020     #/refills: 180/3  When pt was asked to return for OV: 6 months  Upcoming appt/reason: 4/28/21, med check  Was pt informed of any over due labs: none    Lab Results   Component Value

## 2021-11-02 ENCOUNTER — MED REC SCAN ONLY (OUTPATIENT)
Dept: INTERNAL MEDICINE CLINIC | Facility: CLINIC | Age: 51
End: 2021-11-02

## 2022-01-09 DIAGNOSIS — J30.2 SEASONAL ALLERGIES: ICD-10-CM

## 2022-01-10 RX ORDER — FLUTICASONE PROPIONATE 50 MCG
SPRAY, SUSPENSION (ML) NASAL
Qty: 48 G | Refills: 2 | Status: SHIPPED | OUTPATIENT
Start: 2022-01-10

## 2022-02-08 RX ORDER — LABETALOL 100 MG/1
TABLET, FILM COATED ORAL
Qty: 180 TABLET | Refills: 0 | Status: SHIPPED | OUTPATIENT
Start: 2022-02-08

## 2022-04-12 RX ORDER — ALBUTEROL SULFATE 90 UG/1
AEROSOL, METERED RESPIRATORY (INHALATION)
Qty: 51 G | Refills: 0 | Status: SHIPPED | OUTPATIENT
Start: 2022-04-12

## 2022-04-27 RX ORDER — ATORVASTATIN CALCIUM 40 MG/1
TABLET, FILM COATED ORAL
Qty: 90 TABLET | Refills: 0 | Status: SHIPPED | OUTPATIENT
Start: 2022-04-27

## 2022-04-27 RX ORDER — BLOOD-GLUCOSE METER
KIT MISCELLANEOUS
Qty: 100 STRIP | Refills: 0 | Status: SHIPPED | OUTPATIENT
Start: 2022-04-27

## 2022-04-27 RX ORDER — LISINOPRIL AND HYDROCHLOROTHIAZIDE 20; 12.5 MG/1; MG/1
TABLET ORAL
Qty: 90 TABLET | Refills: 0 | Status: SHIPPED | OUTPATIENT
Start: 2022-04-27

## 2022-05-10 RX ORDER — LABETALOL 100 MG/1
TABLET, FILM COATED ORAL
Qty: 60 TABLET | Refills: 0 | Status: SHIPPED | OUTPATIENT
Start: 2022-05-10

## 2022-05-10 NOTE — TELEPHONE ENCOUNTER
Hypertension Medications Protocol Failed 05/08/2022 11:26 PM   Protocol Details  Appointment in past 6 or next 3 months    CMP or BMP in past 12 months    Last serum creatinine< 2.0     Last OV relevant to medication: 10/28/2021   Last refill date: 2/8/2022   #/refills: 180-0  When pt was asked to return for OV: Return in about 6 months (around 4/28/2022) for med check.   Upcoming appt/reason: Gianlucakeila Goltz  Was pt informed of any over due labs: Yes over due, LMTCB  Lab Results   Component Value Date     (H) 10/21/2021    BUN 11 10/21/2021    BUNCREA 11.4 10/23/2020    CREATSERUM 0.89 10/21/2021    ANIONGAP 4 10/21/2021    GFR 86 03/22/2017    GFRNAA 75 10/21/2021    GFRAA 87 10/21/2021    CA 8.9 10/21/2021    OSMOCALC 286 10/21/2021    ALKPHO 84 10/21/2021    AST 16 10/21/2021    ALT 38 10/21/2021    BILT 0.5 10/21/2021    TP 7.1 10/21/2021    ALB 3.8 10/21/2021    GLOBULIN 3.3 10/21/2021    AGRATIO 1.9 03/29/2016     10/21/2021    K 3.9 10/21/2021     10/21/2021    CO2 30.0 10/21/2021

## 2022-06-30 ENCOUNTER — TELEPHONE (OUTPATIENT)
Dept: INTERNAL MEDICINE CLINIC | Facility: CLINIC | Age: 52
End: 2022-06-30

## 2022-07-25 DIAGNOSIS — R73.02 IGT (IMPAIRED GLUCOSE TOLERANCE): ICD-10-CM

## 2022-07-25 DIAGNOSIS — E78.00 PURE HYPERCHOLESTEROLEMIA: ICD-10-CM

## 2022-07-25 DIAGNOSIS — I10 ESSENTIAL HYPERTENSION: ICD-10-CM

## 2022-07-27 RX ORDER — ATORVASTATIN CALCIUM 40 MG/1
TABLET, FILM COATED ORAL
Qty: 30 TABLET | Refills: 0 | Status: SHIPPED | OUTPATIENT
Start: 2022-07-27

## 2022-07-27 RX ORDER — LISINOPRIL AND HYDROCHLOROTHIAZIDE 20; 12.5 MG/1; MG/1
TABLET ORAL
Qty: 30 TABLET | Refills: 0 | Status: SHIPPED | OUTPATIENT
Start: 2022-07-27

## 2022-07-27 RX ORDER — BLOOD-GLUCOSE METER
KIT MISCELLANEOUS
Qty: 100 STRIP | Refills: 0 | Status: SHIPPED | OUTPATIENT
Start: 2022-07-27

## 2022-07-27 NOTE — TELEPHONE ENCOUNTER
Last OV relevant to medication: 10/28/21 PE   Last refill date: 4/27/22   90 day supply      When pt was asked to return for OV: 6months   Upcoming appt/reason: No future appointments. Was pt informed of any over due labs: due . Pt notified through [employer]     Lab Results   Component Value Date     (H) 10/21/2021    BUN 11 10/21/2021    BUNCREA 11.4 10/23/2020    CREATSERUM 0.89 10/21/2021    ANIONGAP 4 10/21/2021    GFR 86 03/22/2017    GFRNAA 75 10/21/2021    GFRAA 87 10/21/2021    CA 8.9 10/21/2021    OSMOCALC 286 10/21/2021    ALKPHO 84 10/21/2021    AST 16 10/21/2021    ALT 38 10/21/2021    BILT 0.5 10/21/2021    TP 7.1 10/21/2021    ALB 3.8 10/21/2021    GLOBULIN 3.3 10/21/2021    AGRATIO 1.9 03/29/2016     10/21/2021    K 3.9 10/21/2021     10/21/2021    CO2 30.0 10/21/2021     Lab Results   Component Value Date     (H) 10/21/2021    A1C 6.4 (H) 10/21/2021     changed to local pharmacy from mail order . Pt needs appt and labs to be done  .  Pt notified through [employer]

## 2022-07-29 DIAGNOSIS — R73.02 IGT (IMPAIRED GLUCOSE TOLERANCE): ICD-10-CM

## 2022-07-29 DIAGNOSIS — I10 ESSENTIAL HYPERTENSION: ICD-10-CM

## 2022-07-29 DIAGNOSIS — E78.00 PURE HYPERCHOLESTEROLEMIA: ICD-10-CM

## 2022-07-29 RX ORDER — BLOOD-GLUCOSE METER
KIT MISCELLANEOUS
Qty: 100 STRIP | Refills: 3 | OUTPATIENT
Start: 2022-07-29

## 2022-07-29 RX ORDER — LISINOPRIL AND HYDROCHLOROTHIAZIDE 20; 12.5 MG/1; MG/1
TABLET ORAL
Qty: 90 TABLET | Refills: 3 | OUTPATIENT
Start: 2022-07-29

## 2022-07-29 RX ORDER — ATORVASTATIN CALCIUM 40 MG/1
TABLET, FILM COATED ORAL
Qty: 90 TABLET | Refills: 3 | OUTPATIENT
Start: 2022-07-29

## 2022-10-07 ENCOUNTER — TELEPHONE (OUTPATIENT)
Dept: INTERNAL MEDICINE CLINIC | Facility: CLINIC | Age: 52
End: 2022-10-07

## 2022-10-07 DIAGNOSIS — J30.2 SEASONAL ALLERGIES: ICD-10-CM

## 2022-10-07 RX ORDER — ALBUTEROL SULFATE 90 UG/1
1-2 AEROSOL, METERED RESPIRATORY (INHALATION) EVERY 4 HOURS PRN
Qty: 3 EACH | Refills: 0 | Status: SHIPPED | OUTPATIENT
Start: 2022-10-07

## 2022-10-07 RX ORDER — FLUTICASONE PROPIONATE 50 MCG
2 SPRAY, SUSPENSION (ML) NASAL AS NEEDED
Qty: 48 G | Refills: 2 | Status: SHIPPED | OUTPATIENT
Start: 2022-10-07

## 2022-10-07 NOTE — TELEPHONE ENCOUNTER
Last OV relevant to medication:10/28/2021 PE   Last refill date: 04/12/2022   #/refills: 51g-1  When pt was asked to return for OV: Return in about 6 months (around 4/28/2022) for med check  Upcoming appt/reason: n/a mychart  Was pt informed of any over due labs: yes, mychart sent

## 2023-07-03 DIAGNOSIS — J30.2 SEASONAL ALLERGIES: ICD-10-CM

## 2023-07-10 RX ORDER — FLUTICASONE PROPIONATE 50 MCG
SPRAY, SUSPENSION (ML) NASAL
Qty: 48 G | Refills: 3 | OUTPATIENT
Start: 2023-07-10

## 2023-09-12 ENCOUNTER — OFFICE VISIT (OUTPATIENT)
Dept: OBGYN CLINIC | Facility: CLINIC | Age: 53
End: 2023-09-12
Payer: COMMERCIAL

## 2023-09-12 VITALS
WEIGHT: 196.38 LBS | BODY MASS INDEX: 33.94 KG/M2 | HEART RATE: 74 BPM | SYSTOLIC BLOOD PRESSURE: 146 MMHG | DIASTOLIC BLOOD PRESSURE: 92 MMHG | HEIGHT: 63.78 IN

## 2023-09-12 DIAGNOSIS — N95.1 SYMPTOMATIC MENOPAUSAL OR FEMALE CLIMACTERIC STATES: ICD-10-CM

## 2023-09-12 DIAGNOSIS — Z12.31 ENCOUNTER FOR SCREENING MAMMOGRAM FOR MALIGNANT NEOPLASM OF BREAST: ICD-10-CM

## 2023-09-12 DIAGNOSIS — Z75.8 DOES NOT HAVE PRIMARY CARE PROVIDER: ICD-10-CM

## 2023-09-12 DIAGNOSIS — Z01.419 ENCOUNTER FOR WELL WOMAN EXAM WITH ROUTINE GYNECOLOGICAL EXAM: Primary | ICD-10-CM

## 2023-09-12 PROCEDURE — 3077F SYST BP >= 140 MM HG: CPT | Performed by: OBSTETRICS & GYNECOLOGY

## 2023-09-12 PROCEDURE — 3080F DIAST BP >= 90 MM HG: CPT | Performed by: OBSTETRICS & GYNECOLOGY

## 2023-09-12 PROCEDURE — 99386 PREV VISIT NEW AGE 40-64: CPT | Performed by: OBSTETRICS & GYNECOLOGY

## 2023-09-12 PROCEDURE — 3008F BODY MASS INDEX DOCD: CPT | Performed by: OBSTETRICS & GYNECOLOGY

## 2023-09-12 RX ORDER — MONTELUKAST SODIUM 10 MG/1
10 TABLET ORAL DAILY
COMMUNITY
Start: 2023-08-23

## 2023-09-12 RX ORDER — ESTROGEN,CON/M-PROGEST ACET 0.3-1.5MG
1 TABLET ORAL DAILY
Qty: 84 TABLET | Refills: 3 | Status: SHIPPED | OUTPATIENT
Start: 2023-09-12 | End: 2023-09-12

## 2023-09-12 RX ORDER — AZELASTINE 1 MG/ML
2 SPRAY, METERED NASAL 2 TIMES DAILY
COMMUNITY
Start: 2023-03-14

## 2023-09-12 RX ORDER — ESTROGEN,CON/M-PROGEST ACET 0.3-1.5MG
1 TABLET ORAL DAILY
Qty: 30 TABLET | Refills: 0 | Status: SHIPPED | OUTPATIENT
Start: 2023-09-12

## 2023-09-18 ENCOUNTER — OFFICE VISIT (OUTPATIENT)
Dept: FAMILY MEDICINE CLINIC | Facility: CLINIC | Age: 53
End: 2023-09-18
Payer: COMMERCIAL

## 2023-09-18 VITALS
TEMPERATURE: 97 F | HEART RATE: 72 BPM | HEIGHT: 64 IN | BODY MASS INDEX: 33.12 KG/M2 | RESPIRATION RATE: 16 BRPM | WEIGHT: 194 LBS | DIASTOLIC BLOOD PRESSURE: 68 MMHG | SYSTOLIC BLOOD PRESSURE: 122 MMHG | OXYGEN SATURATION: 98 %

## 2023-09-18 DIAGNOSIS — I10 PRIMARY HYPERTENSION: ICD-10-CM

## 2023-09-18 DIAGNOSIS — E78.5 HYPERLIPIDEMIA, UNSPECIFIED HYPERLIPIDEMIA TYPE: ICD-10-CM

## 2023-09-18 DIAGNOSIS — Z00.00 WELL ADULT EXAM: Primary | ICD-10-CM

## 2023-09-18 DIAGNOSIS — Z12.11 COLON CANCER SCREENING: ICD-10-CM

## 2023-09-18 DIAGNOSIS — M25.50 MULTIPLE JOINT PAIN: ICD-10-CM

## 2023-09-18 DIAGNOSIS — M79.10 MYALGIA: ICD-10-CM

## 2023-09-18 DIAGNOSIS — R53.83 FATIGUE, UNSPECIFIED TYPE: ICD-10-CM

## 2023-09-18 DIAGNOSIS — Z13.31 NEGATIVE DEPRESSION SCREENING: ICD-10-CM

## 2023-09-18 DIAGNOSIS — R41.3 MEMORY PROBLEM: ICD-10-CM

## 2023-09-18 DIAGNOSIS — Z81.8 FAMILY HISTORY OF DEMENTIA: ICD-10-CM

## 2023-09-18 DIAGNOSIS — R73.03 PREDIABETES: ICD-10-CM

## 2023-10-25 ENCOUNTER — LAB ENCOUNTER (OUTPATIENT)
Dept: LAB | Age: 53
End: 2023-10-25
Attending: STUDENT IN AN ORGANIZED HEALTH CARE EDUCATION/TRAINING PROGRAM

## 2023-10-25 DIAGNOSIS — R73.03 PREDIABETES: ICD-10-CM

## 2023-10-25 DIAGNOSIS — E78.5 HYPERLIPIDEMIA, UNSPECIFIED HYPERLIPIDEMIA TYPE: ICD-10-CM

## 2023-10-25 DIAGNOSIS — M25.50 MULTIPLE JOINT PAIN: ICD-10-CM

## 2023-10-25 DIAGNOSIS — M79.10 MYALGIA: ICD-10-CM

## 2023-10-25 LAB
ALBUMIN SERPL-MCNC: 3.8 G/DL (ref 3.4–5)
ALBUMIN/GLOB SERPL: 1.2 {RATIO} (ref 1–2)
ALP LIVER SERPL-CCNC: 89 U/L
ALT SERPL-CCNC: 30 U/L
ANION GAP SERPL CALC-SCNC: 7 MMOL/L (ref 0–18)
AST SERPL-CCNC: 18 U/L (ref 15–37)
BILIRUB SERPL-MCNC: 0.7 MG/DL (ref 0.1–2)
BUN BLD-MCNC: 11 MG/DL (ref 7–18)
CALCIUM BLD-MCNC: 8.8 MG/DL (ref 8.5–10.1)
CHLORIDE SERPL-SCNC: 99 MMOL/L (ref 98–112)
CHOLEST SERPL-MCNC: 195 MG/DL (ref ?–200)
CO2 SERPL-SCNC: 28 MMOL/L (ref 21–32)
CREAT BLD-MCNC: 0.84 MG/DL
CRP SERPL-MCNC: <0.29 MG/DL (ref ?–0.3)
EGFRCR SERPLBLD CKD-EPI 2021: 83 ML/MIN/1.73M2 (ref 60–?)
ERYTHROCYTE [DISTWIDTH] IN BLOOD BY AUTOMATED COUNT: 12 %
ERYTHROCYTE [SEDIMENTATION RATE] IN BLOOD: 11 MM/HR
EST. AVERAGE GLUCOSE BLD GHB EST-MCNC: 137 MG/DL (ref 68–126)
FASTING PATIENT LIPID ANSWER: YES
FASTING STATUS PATIENT QL REPORTED: YES
GLOBULIN PLAS-MCNC: 3.1 G/DL (ref 2.8–4.4)
GLUCOSE BLD-MCNC: 143 MG/DL (ref 70–99)
HBA1C MFR BLD: 6.4 % (ref ?–5.7)
HCT VFR BLD AUTO: 37.6 %
HDLC SERPL-MCNC: 78 MG/DL (ref 40–59)
HGB BLD-MCNC: 12.8 G/DL
LDLC SERPL CALC-MCNC: 91 MG/DL (ref ?–100)
MCH RBC QN AUTO: 31.7 PG (ref 26–34)
MCHC RBC AUTO-ENTMCNC: 34 G/DL (ref 31–37)
MCV RBC AUTO: 93.1 FL
NONHDLC SERPL-MCNC: 117 MG/DL (ref ?–130)
OSMOLALITY SERPL CALC.SUM OF ELEC: 280 MOSM/KG (ref 275–295)
PLATELET # BLD AUTO: 319 10(3)UL (ref 150–450)
POTASSIUM SERPL-SCNC: 3.8 MMOL/L (ref 3.5–5.1)
PROT SERPL-MCNC: 6.9 G/DL (ref 6.4–8.2)
RBC # BLD AUTO: 4.04 X10(6)UL
RHEUMATOID FACT SERPL-ACNC: <10 IU/ML (ref ?–15)
SODIUM SERPL-SCNC: 134 MMOL/L (ref 136–145)
TRIGL SERPL-MCNC: 156 MG/DL (ref 30–149)
VLDLC SERPL CALC-MCNC: 25 MG/DL (ref 0–30)
WBC # BLD AUTO: 5.5 X10(3) UL (ref 4–11)

## 2023-10-25 PROCEDURE — 86140 C-REACTIVE PROTEIN: CPT

## 2023-10-25 PROCEDURE — 86225 DNA ANTIBODY NATIVE: CPT

## 2023-10-25 PROCEDURE — 80061 LIPID PANEL: CPT

## 2023-10-25 PROCEDURE — 86039 ANTINUCLEAR ANTIBODIES (ANA): CPT

## 2023-10-25 PROCEDURE — 86038 ANTINUCLEAR ANTIBODIES: CPT

## 2023-10-25 PROCEDURE — 86235 NUCLEAR ANTIGEN ANTIBODY: CPT

## 2023-10-25 PROCEDURE — 85027 COMPLETE CBC AUTOMATED: CPT

## 2023-10-25 PROCEDURE — 86200 CCP ANTIBODY: CPT

## 2023-10-25 PROCEDURE — 36415 COLL VENOUS BLD VENIPUNCTURE: CPT

## 2023-10-25 PROCEDURE — 80053 COMPREHEN METABOLIC PANEL: CPT

## 2023-10-25 PROCEDURE — 83036 HEMOGLOBIN GLYCOSYLATED A1C: CPT

## 2023-10-25 PROCEDURE — 86431 RHEUMATOID FACTOR QUANT: CPT

## 2023-10-25 PROCEDURE — 85652 RBC SED RATE AUTOMATED: CPT

## 2023-10-26 ENCOUNTER — PATIENT MESSAGE (OUTPATIENT)
Dept: FAMILY MEDICINE CLINIC | Facility: CLINIC | Age: 53
End: 2023-10-26

## 2023-10-26 DIAGNOSIS — R76.8 POSITIVE ANA (ANTINUCLEAR ANTIBODY): Primary | ICD-10-CM

## 2023-10-26 LAB
CCP IGG SERPL-ACNC: <0.4 U/ML (ref 0–6.9)
NUCLEAR IGG TITR SER IF: POSITIVE {TITER}

## 2023-10-26 NOTE — PROGRESS NOTES
Good afternoon Deloras Arm,     I hope you have been well. I wanted to update you on the status of your lab results. There are 2 results still pending, but so far your labs have been showing stable A1C (blood sugar), very mildly elevated triglycerides, and borderline low sodium. I will let you know once your remaining blood test results are available.      Dr. Yolanda Calderon

## 2023-10-27 LAB
ANA NUCLEOLAR TITR SER IF: 160 {TITER}
CENTROMERE IGG SER-ACNC: <0.4 U/ML
DSDNA AB TITR SER: <10 {TITER}
ENA JO1 AB SER IA-ACNC: <0.3 U/ML
ENA RNP IGG SER IA-ACNC: 0.9 U/ML
ENA SCL70 IGG SER IA-ACNC: <0.6 U/ML
ENA SM IGG SER IA-ACNC: <0.7 U/ML
ENA SS-A IGG SER IA-ACNC: <0.4 U/ML
ENA SS-B IGG SER IA-ACNC: <0.4 U/ML
U1 SNRNP IGG SER IA-ACNC: 0.6 U/ML

## 2023-10-27 NOTE — TELEPHONE ENCOUNTER
Future Appointments   Date Time Provider Denny Stroud   10/30/2023  1:40 PM Donte Ring MD [facility] [address] [facility]   11/27/2023 10:30 AM PF [facility] RM1 (1.5T) PF [facility] [place]   1/4/2024 10:30 AM Michael Quintanilla MD [address] [address] SUB GI   3/11/2024 11:40 AM PF [facility] RM1 PF [facility] [place]

## 2023-10-30 ENCOUNTER — OFFICE VISIT (OUTPATIENT)
Dept: FAMILY MEDICINE CLINIC | Facility: CLINIC | Age: 53
End: 2023-10-30

## 2023-10-30 VITALS
RESPIRATION RATE: 16 BRPM | HEART RATE: 69 BPM | TEMPERATURE: 97 F | SYSTOLIC BLOOD PRESSURE: 128 MMHG | DIASTOLIC BLOOD PRESSURE: 80 MMHG | OXYGEN SATURATION: 98 % | WEIGHT: 195.25 LBS | BODY MASS INDEX: 33.33 KG/M2 | HEIGHT: 64 IN

## 2023-10-30 DIAGNOSIS — I10 ESSENTIAL HYPERTENSION: ICD-10-CM

## 2023-10-30 DIAGNOSIS — M25.50 MULTIPLE JOINT PAIN: ICD-10-CM

## 2023-10-30 DIAGNOSIS — R53.83 FATIGUE, UNSPECIFIED TYPE: ICD-10-CM

## 2023-10-30 DIAGNOSIS — Z28.21 INFLUENZA VACCINATION DECLINED BY PATIENT: ICD-10-CM

## 2023-10-30 DIAGNOSIS — E78.2 MIXED HYPERLIPIDEMIA: ICD-10-CM

## 2023-10-30 DIAGNOSIS — J30.2 SEASONAL ALLERGIES: ICD-10-CM

## 2023-10-30 DIAGNOSIS — M79.10 MYALGIA: ICD-10-CM

## 2023-10-30 DIAGNOSIS — E87.1 HYPONATREMIA: Primary | ICD-10-CM

## 2023-10-30 DIAGNOSIS — R73.03 PREDIABETES: ICD-10-CM

## 2023-10-30 DIAGNOSIS — R76.8 ANA POSITIVE: ICD-10-CM

## 2023-10-30 PROCEDURE — 99214 OFFICE O/P EST MOD 30 MIN: CPT | Performed by: STUDENT IN AN ORGANIZED HEALTH CARE EDUCATION/TRAINING PROGRAM

## 2023-10-30 PROCEDURE — 3079F DIAST BP 80-89 MM HG: CPT | Performed by: STUDENT IN AN ORGANIZED HEALTH CARE EDUCATION/TRAINING PROGRAM

## 2023-10-30 PROCEDURE — 3008F BODY MASS INDEX DOCD: CPT | Performed by: STUDENT IN AN ORGANIZED HEALTH CARE EDUCATION/TRAINING PROGRAM

## 2023-10-30 PROCEDURE — 3074F SYST BP LT 130 MM HG: CPT | Performed by: STUDENT IN AN ORGANIZED HEALTH CARE EDUCATION/TRAINING PROGRAM

## 2023-10-30 RX ORDER — MELOXICAM 7.5 MG/1
7.5 TABLET ORAL DAILY PRN
Qty: 30 TABLET | Refills: 0 | Status: SHIPPED | OUTPATIENT
Start: 2023-10-30 | End: 2023-11-13

## 2023-10-30 RX ORDER — BLOOD-GLUCOSE METER
1 KIT MISCELLANEOUS DAILY
Qty: 100 STRIP | Refills: 1 | Status: SHIPPED | OUTPATIENT
Start: 2023-10-30

## 2023-10-30 RX ORDER — LISINOPRIL AND HYDROCHLOROTHIAZIDE 20; 12.5 MG/1; MG/1
1 TABLET ORAL DAILY
Qty: 90 TABLET | Refills: 1 | Status: SHIPPED | OUTPATIENT
Start: 2023-10-30

## 2023-10-30 RX ORDER — LABETALOL 100 MG/1
100 TABLET, FILM COATED ORAL 2 TIMES DAILY
Qty: 180 TABLET | Refills: 1 | Status: SHIPPED | OUTPATIENT
Start: 2023-10-30

## 2023-10-30 RX ORDER — ATORVASTATIN CALCIUM 40 MG/1
40 TABLET, FILM COATED ORAL DAILY
Qty: 90 TABLET | Refills: 1 | Status: SHIPPED | OUTPATIENT
Start: 2023-10-30

## 2023-10-30 RX ORDER — FLUTICASONE PROPIONATE 50 MCG
2 SPRAY, SUSPENSION (ML) NASAL AS NEEDED
Qty: 48 G | Refills: 2 | Status: SHIPPED | OUTPATIENT
Start: 2023-10-30

## 2023-11-20 ENCOUNTER — TELEPHONE (OUTPATIENT)
Dept: OBGYN CLINIC | Facility: CLINIC | Age: 53
End: 2023-11-20

## 2023-11-20 DIAGNOSIS — N95.1 SYMPTOMATIC MENOPAUSAL OR FEMALE CLIMACTERIC STATES: ICD-10-CM

## 2023-11-20 RX ORDER — ESTROGEN,CON/M-PROGEST ACET 0.3-1.5MG
1 TABLET ORAL DAILY
Qty: 90 TABLET | Refills: 2 | Status: SHIPPED | OUTPATIENT
Start: 2023-11-20

## 2023-11-20 NOTE — TELEPHONE ENCOUNTER
Refill request received by fax for 90 day supply of  Prempro 0.3/1.5 mg  Last OV: 9/12/2023  Last refill date: 9/12/2023 Prempro #30 with 0 refills  Follow-up: 1 year  Next appt.: none scheduled    Routed to Dr. Chela Clayton- please advise on refill request.  [facility] to fill until next annual exam?

## 2023-11-20 NOTE — TELEPHONE ENCOUNTER
Yes thank you - I meant to send refill on the date of her encounter and I would want it to be for a year.

## 2024-01-23 ENCOUNTER — PATIENT MESSAGE (OUTPATIENT)
Dept: FAMILY MEDICINE CLINIC | Facility: CLINIC | Age: 54
End: 2024-01-23

## 2024-01-23 DIAGNOSIS — J45.20 MILD INTERMITTENT ASTHMA WITHOUT COMPLICATION: Primary | ICD-10-CM

## 2024-01-23 RX ORDER — ALBUTEROL SULFATE 90 UG/1
1-2 AEROSOL, METERED RESPIRATORY (INHALATION) EVERY 4 HOURS PRN
Qty: 3 EACH | Refills: 0 | OUTPATIENT
Start: 2024-01-23

## 2024-01-23 RX ORDER — ALBUTEROL SULFATE 90 UG/1
1-2 AEROSOL, METERED RESPIRATORY (INHALATION) EVERY 4 HOURS PRN
Qty: 6.7 G | Refills: 3 | Status: SHIPPED | OUTPATIENT
Start: 2024-01-23

## 2024-01-23 RX ORDER — ALBUTEROL SULFATE 90 UG/1
2 AEROSOL, METERED RESPIRATORY (INHALATION) EVERY 4 HOURS PRN
Qty: 6.7 G | Refills: 0 | Status: SHIPPED | OUTPATIENT
Start: 2024-01-23

## 2024-01-23 NOTE — TELEPHONE ENCOUNTER
Medication Quantity Refills Start End   albuterol 108 (90 Base) MCG/ACT Inhalation Aero Soln 3 each 0 10/7/2022 --   Sig:   Inhale 1-2 puffs into the lungs every 4 (four) hours as needed.     Route:   Inhalation     Note to Pharmacy:   Pt needs appt     Order #:   [id number]       Last OV 10/30/23  Future Appointments   Date Time Provider Department Center   2/12/2024 10:00 AM  MR RM3 (3T WIDE)  [facility]   3/11/2024 11:40 AM PF [facility] RM1 PF [facility] [place]        RX not prescribed by pcp before -l please advise

## 2024-01-23 NOTE — TELEPHONE ENCOUNTER
From: Kelly D Baumgarten  To: Jordana Damon  Sent: 1/23/2024 1:33 PM CST  Subject: Albuterol    Hi, Dr. Damon! I’m completely out of my Albuterol and for some reason it’s not in my [employer] renewals anymore. I’ve been periodically having a hard time where I need it lately and yesterday I didn’t have any. I put it in a request for a renewal and it got denied. Do you know why? Anything you can do? It makes me nervous not to have it.     Thank you!

## 2024-02-12 ENCOUNTER — HOSPITAL ENCOUNTER (OUTPATIENT)
Dept: MRI IMAGING | Facility: HOSPITAL | Age: 54
Discharge: HOME | End: 2024-02-12
Attending: STUDENT IN AN ORGANIZED HEALTH CARE EDUCATION/TRAINING PROGRAM
Payer: COMMERCIAL

## 2024-02-12 DIAGNOSIS — Z81.8 FAMILY HISTORY OF DEMENTIA: ICD-10-CM

## 2024-02-12 DIAGNOSIS — R41.3 MEMORY PROBLEM: ICD-10-CM

## 2024-02-12 PROCEDURE — A9575 INJ GADOTERATE MEGLUMI 0.1ML: HCPCS | Performed by: STUDENT IN AN ORGANIZED HEALTH CARE EDUCATION/TRAINING PROGRAM

## 2024-02-12 PROCEDURE — 70553 MRI BRAIN STEM W/O & W/DYE: CPT | Performed by: STUDENT IN AN ORGANIZED HEALTH CARE EDUCATION/TRAINING PROGRAM

## 2024-02-12 RX ORDER — GADOTERATE MEGLUMINE 376.9 MG/ML
18 INJECTION INTRAVENOUS
Status: COMPLETED | OUTPATIENT
Start: 2024-02-12 | End: 2024-02-12

## 2024-02-12 RX ADMIN — GADOTERATE MEGLUMINE 18 ML: 376.9 INJECTION INTRAVENOUS at 10:56:00

## 2024-02-15 NOTE — PROGRESS NOTES
Tirso Pickens,     Your MRI result has been reviewed. There were no acute significant abnormalities, but there are some preventative risk factor modification recommendations based on the results. Please remember to complete your follow-up blood test and follow-up when you can.    Thank you,  Dr. Damon

## 2024-02-20 ENCOUNTER — PATIENT MESSAGE (OUTPATIENT)
Dept: FAMILY MEDICINE CLINIC | Facility: CLINIC | Age: 54
End: 2024-02-20

## 2024-02-20 NOTE — TELEPHONE ENCOUNTER
From: Kelly D Baumgarten  To: Jordana Damon  Sent: 2/20/2024 12:56 PM CST  Subject: MRI    Hi, Dr. Damon!  Can you elaborate on this a bit regarding my MRI?  What are the preventative risk factor modifications and why are they needed if everything was okay? Please let me know! Will get my blood work test done this week!     Your MRI result has been reviewed. There were no acute significant abnormalities, but there are some preventative risk factor modification recommendations based on the results.

## 2024-02-21 NOTE — TELEPHONE ENCOUNTER
Please refer to MRI result note: \"Please remember to complete your follow-up blood test and follow-up when you can\"

## 2024-02-22 RX ORDER — MONTELUKAST SODIUM 10 MG/1
10 TABLET ORAL NIGHTLY
Qty: 90 TABLET | Refills: 1 | Status: SHIPPED | OUTPATIENT
Start: 2024-02-22 | End: 2024-08-20

## 2024-02-22 RX ORDER — MONTELUKAST SODIUM 10 MG/1
10 TABLET ORAL DAILY
Qty: 90 TABLET | Refills: 0 | Status: SHIPPED | OUTPATIENT
Start: 2024-02-22 | End: 2024-02-22

## 2024-02-22 NOTE — TELEPHONE ENCOUNTER
Requested Renewals     montelukast 10 MG Oral Tab         Sig: Take 1 tablet (10 mg total) by mouth daily.    Disp: 90 tablet    Refills: 1    Start: 2/21/2024    Class: Normal    Last ordered: 5 months ago (9/12/2023) by Reported External/Patient    Asthma & COPD Medication Protocol Aiwtyw2702/21/2024 03:12 PM   Protocol Details Asthma Action Score greater than or equal to 20    AAP/ACT given in last 12 months    Appointment in past 6 or next 3 months             Future Appointments   Date Time Provider Department Center   3/11/2024 11:40 AM PF [facility] PF [facility] [place]     LOV: 10/30/23  Last labs done 10/25/23    -medication pended for review.

## 2024-03-06 ENCOUNTER — LAB ENCOUNTER (OUTPATIENT)
Dept: LAB | Age: 54
End: 2024-03-06
Attending: STUDENT IN AN ORGANIZED HEALTH CARE EDUCATION/TRAINING PROGRAM
Payer: COMMERCIAL

## 2024-03-06 DIAGNOSIS — E87.1 HYPONATREMIA: ICD-10-CM

## 2024-03-06 LAB
ANION GAP SERPL CALC-SCNC: 7 MMOL/L (ref 0–18)
BUN BLD-MCNC: 10 MG/DL (ref 9–23)
CALCIUM BLD-MCNC: 9.4 MG/DL (ref 8.5–10.1)
CHLORIDE SERPL-SCNC: 100 MMOL/L (ref 98–112)
CO2 SERPL-SCNC: 29 MMOL/L (ref 21–32)
CREAT BLD-MCNC: 0.84 MG/DL
EGFRCR SERPLBLD CKD-EPI 2021: 83 ML/MIN/1.73M2 (ref 60–?)
FASTING STATUS PATIENT QL REPORTED: NO
GLUCOSE BLD-MCNC: 152 MG/DL (ref 70–99)
OSMOLALITY SERPL CALC.SUM OF ELEC: 284 MOSM/KG (ref 275–295)
POTASSIUM SERPL-SCNC: 3.7 MMOL/L (ref 3.5–5.1)
SODIUM SERPL-SCNC: 136 MMOL/L (ref 136–145)

## 2024-03-06 PROCEDURE — 80048 BASIC METABOLIC PNL TOTAL CA: CPT

## 2024-03-06 PROCEDURE — 36415 COLL VENOUS BLD VENIPUNCTURE: CPT

## 2024-03-11 ENCOUNTER — HOSPITAL ENCOUNTER (OUTPATIENT)
Dept: MAMMOGRAPHY | Age: 54
Discharge: HOME | End: 2024-03-11
Attending: OBSTETRICS & GYNECOLOGY
Payer: COMMERCIAL

## 2024-03-11 DIAGNOSIS — Z12.31 ENCOUNTER FOR SCREENING MAMMOGRAM FOR MALIGNANT NEOPLASM OF BREAST: ICD-10-CM

## 2024-03-11 PROCEDURE — 77067 SCR MAMMO BI INCL CAD: CPT | Performed by: OBSTETRICS & GYNECOLOGY

## 2024-03-11 PROCEDURE — 77063 BREAST TOMOSYNTHESIS BI: CPT | Performed by: OBSTETRICS & GYNECOLOGY

## 2024-03-11 NOTE — PROGRESS NOTES
Tirso Pickens,     Good news - your sodium (salt) level has normalized. Please follow-up tomorrow as planned so that we can discuss next steps.     Have a nice day,  Dr. Damon

## 2024-03-12 ENCOUNTER — OFFICE VISIT (OUTPATIENT)
Dept: FAMILY MEDICINE CLINIC | Facility: CLINIC | Age: 54
End: 2024-03-12
Payer: COMMERCIAL

## 2024-03-12 VITALS
HEIGHT: 64 IN | OXYGEN SATURATION: 97 % | SYSTOLIC BLOOD PRESSURE: 122 MMHG | TEMPERATURE: 97 F | RESPIRATION RATE: 16 BRPM | BODY MASS INDEX: 33.8 KG/M2 | WEIGHT: 198 LBS | HEART RATE: 73 BPM | DIASTOLIC BLOOD PRESSURE: 70 MMHG

## 2024-03-12 DIAGNOSIS — Z12.11 COLON CANCER SCREENING: ICD-10-CM

## 2024-03-12 DIAGNOSIS — R73.03 PREDIABETES: ICD-10-CM

## 2024-03-12 DIAGNOSIS — I10 ESSENTIAL HYPERTENSION: ICD-10-CM

## 2024-03-12 DIAGNOSIS — D18.02 VENOUS ANGIOMA OF BRAIN (HCC): ICD-10-CM

## 2024-03-12 DIAGNOSIS — J45.30 MILD PERSISTENT ASTHMA WITHOUT COMPLICATION (HCC): Primary | ICD-10-CM

## 2024-03-12 DIAGNOSIS — R41.3 MEMORY PROBLEM: ICD-10-CM

## 2024-03-12 DIAGNOSIS — E78.2 MIXED HYPERLIPIDEMIA: ICD-10-CM

## 2024-03-12 DIAGNOSIS — Z81.8 FAMILY HISTORY OF DEMENTIA: ICD-10-CM

## 2024-03-12 DIAGNOSIS — I67.82 ISCHEMIC CEREBROVASCULAR DISEASE: ICD-10-CM

## 2024-03-12 DIAGNOSIS — E87.1 HYPONATREMIA: ICD-10-CM

## 2024-03-12 PROCEDURE — 3008F BODY MASS INDEX DOCD: CPT | Performed by: STUDENT IN AN ORGANIZED HEALTH CARE EDUCATION/TRAINING PROGRAM

## 2024-03-12 PROCEDURE — 99214 OFFICE O/P EST MOD 30 MIN: CPT | Performed by: STUDENT IN AN ORGANIZED HEALTH CARE EDUCATION/TRAINING PROGRAM

## 2024-03-12 PROCEDURE — 3074F SYST BP LT 130 MM HG: CPT | Performed by: STUDENT IN AN ORGANIZED HEALTH CARE EDUCATION/TRAINING PROGRAM

## 2024-03-12 PROCEDURE — 3078F DIAST BP <80 MM HG: CPT | Performed by: STUDENT IN AN ORGANIZED HEALTH CARE EDUCATION/TRAINING PROGRAM

## 2024-03-12 RX ORDER — LABETALOL 100 MG/1
50 TABLET, FILM COATED ORAL 2 TIMES DAILY
Qty: 90 TABLET | Refills: 1 | Status: SHIPPED | OUTPATIENT
Start: 2024-03-12 | End: 2024-09-08

## 2024-03-12 RX ORDER — FLUTICASONE PROPIONATE AND SALMETEROL XINAFOATE 115; 21 UG/1; UG/1
1 AEROSOL, METERED RESPIRATORY (INHALATION) 2 TIMES DAILY
Qty: 12 G | Refills: 0 | Status: SHIPPED | OUTPATIENT
Start: 2024-03-12 | End: 2024-06-10

## 2024-03-12 RX ORDER — LISINOPRIL AND HYDROCHLOROTHIAZIDE 20; 12.5 MG/1; MG/1
1 TABLET ORAL DAILY
Qty: 90 TABLET | Refills: 1 | Status: SHIPPED | OUTPATIENT
Start: 2024-03-12

## 2024-03-12 RX ORDER — ATORVASTATIN CALCIUM 40 MG/1
40 TABLET, FILM COATED ORAL DAILY
Qty: 90 TABLET | Refills: 1 | Status: SHIPPED | OUTPATIENT
Start: 2024-03-12

## 2024-03-12 RX ORDER — LABETALOL 100 MG/1
100 TABLET, FILM COATED ORAL 2 TIMES DAILY
Qty: 180 TABLET | Refills: 1 | Status: CANCELLED | OUTPATIENT
Start: 2024-03-12

## 2024-03-12 NOTE — PROGRESS NOTES
Subjective:      Chief Complaint   Patient presents with    Test Results     Here to f/up on MRI of Brain    Colonoscopy Screening     Was referred to Dr. Gibson but would like to see someone different    Asthma     Due for ACT     HISTORY OF PRESENT ILLNESS  HPI  HPI obtained per patient report.  Kelly D Baumgarten is a pleasant 53 year old female presenting for follow-up.   She requests a renewed GI referral for a second opinion for bowel prep prior to her screening colonoscopy.  Her asthma symptoms have been worse recently with the changes in weather.    PAST PATIENT HISTORY  Past Medical History:   Diagnosis Date    Allergic rhinitis 2008    Asthma (HCC) 2008    Essential hypertension     Fatigue     HTN (hypertension)     Hyperlipidemia     Hypercholesterolemia    Lateral epicondylitis  of elbow     Mild intermittent asthma without complication (HCC) 03/23/2017    Prediabetes     Tobacco dependency      Past Surgical History:   Procedure Laterality Date    ADENOIDECTOMY  1997    FEMUR/KNEE SURG UNLISTED      \"L knee surg\"    OTHER SURGICAL HISTORY  1987    Knee    TONSILLECTOMY  1997       CURRENT MEDICATIONS  Outpatient Medications Marked as Taking for the 3/12/24 encounter (Office Visit) with Jordana Damon MD   Medication Sig Dispense Refill    atorvastatin 40 MG Oral Tab Take 1 tablet (40 mg total) by mouth daily. 90 tablet 1    lisinopril-hydroCHLOROthiazide 20-12.5 MG Oral Tab Take 1 tablet by mouth daily. 90 tablet 1    metFORMIN 500 MG Oral Tab Take 1 tablet (500 mg total) by mouth 2 (two) times daily with meals. 180 tablet 1    fluticasone-salmeterol 115-21 MCG/ACT Inhalation Aerosol Inhale 1 puff into the lungs 2 (two) times daily. 12 g 0    labetalol 100 MG Oral Tab Take 0.5 tablets (50 mg total) by mouth 2 (two) times daily. 90 tablet 1    montelukast 10 MG Oral Tab Take 1 tablet (10 mg total) by mouth nightly. 90 tablet 1    albuterol 108 (90 Base) MCG/ACT Inhalation Aero Soln Inhale 2 puffs into the  lungs every 4 (four) hours as needed for Wheezing or Shortness of Breath. 6.7 g 0    Conj Estrog-Medroxyprogest Ace (PREMPRO) 0.3-1.5 MG Oral Tab Take 1 tablet by mouth daily. 90 tablet 2    fluticasone propionate 50 MCG/ACT Nasal Suspension 2 sprays by Nasal route as needed. 48 g 2    Glucose Blood (FREESTYLE LITE TEST) In Vitro Strip 1 strip by In Vitro route daily. 100 strip 1    labetalol 100 MG Oral Tab Take 1 tablet (100 mg total) by mouth 2 (two) times daily. 180 tablet 1    azelastine 0.1 % Nasal Solution 2 sprays by Nasal route 2 (two) times daily.      Blood Glucose Monitoring Suppl (FREESTYLE LITE) Does not apply Device Check glucose daily. 1 Device 0    multivitamin Oral Tab Take 1 tablet by mouth daily.      Levocetirizine Dihydrochloride (XYZAL) 5 MG Oral Tab Take 1 tablet (5 mg total) by mouth daily.         HEALTH MAINTENANCE  Immunization History   Administered Date(s) Administered    Covid-19 Vaccine Moderna 100 mcg/0.5 ml 01/22/2021, 02/19/2021, 11/10/2021    Influenza 09/18/2017, 09/04/2018   Deferred Date(s) Deferred    FLUZONE 6 months and older PFS 0.5 ml (61060) 10/30/2023    Influenza Vaccine Refused 10/28/2021       ALLERGIES AND DRUG REACTIONS  Allergies   Allergen Reactions    Pcn [Penicillins] OTHER (SEE COMMENTS)     Yeast infection    Seasonal        Family History   Problem Relation Age of Onset    Other (Alzheimers) Mother     High Blood Pressure Mother     Depression Mother     Asthma Mother     Dementia Mother     Psychiatric Mother     Hypertension Father     Diabetes Father     Stroke Father     Other (Alzheimers) Maternal Grandmother     Other (Heart problems) Maternal Grandmother     Arthritis Maternal Grandmother     Stroke Maternal Grandmother     Dementia Maternal Grandmother     Diabetes Maternal Grandfather     Stroke Maternal Grandfather     Diabetes Maternal Grandfather     Stroke Maternal Grandfather     Other (Heart problems) Maternal Grandfather     Other (Alzheimers)  Paternal Grandmother     Diabetes Paternal Grandmother     High Blood Pressure Paternal Grandmother     Asthma Sister      Social History     Socioeconomic History    Marital status:    Occupational History    Occupation:    Tobacco Use    Smoking status: Former     Packs/day: .5     Types: Cigarettes     Quit date: 2012     Years since quittin.6    Smokeless tobacco: Never   Vaping Use    Vaping Use: Never used   Substance and Sexual Activity    Alcohol use: Yes     Alcohol/week: 5.0 standard drinks of alcohol     Types: 5 Glasses of wine per week     Comment: 1 glass of wine with dinner, 2 glasses on the weekends    Drug use: No    Sexual activity: Yes     Partners: Male   Other Topics Concern    Caffeine Concern Yes    Stress Concern Yes    Weight Concern No    Special Diet No    Exercise No    Seat Belt Yes       Review of Systems   Respiratory:  Positive for wheezing.    All other systems reviewed and are negative.         Objective:      /70   Pulse 73   Temp 97.3 °F (36.3 °C) (Temporal)   Resp 16   Ht 5' 4\" (1.626 m)   Wt 198 lb (89.8 kg)   LMP 2019   SpO2 97%   BMI 33.99 kg/m²   Body mass index is 33.99 kg/m².    Physical Exam  Vitals reviewed.   Constitutional:       General: She is not in acute distress.     Appearance: She is not ill-appearing, toxic-appearing or diaphoretic.   HENT:      Head: Normocephalic and atraumatic.   Eyes:      General: No scleral icterus.        Right eye: No discharge.         Left eye: No discharge.      Extraocular Movements: Extraocular movements intact.      Conjunctiva/sclera: Conjunctivae normal.   Cardiovascular:      Rate and Rhythm: Normal rate.   Pulmonary:      Effort: Pulmonary effort is normal.   Abdominal:      General: There is no distension.   Musculoskeletal:      Cervical back: Neck supple.   Neurological:      Mental Status: She is alert and oriented to person, place, and time.            Assessment and  Plan:      1. Mild persistent asthma without complication (HCC) (Primary)  -     Fluticasone-Salmeterol; Inhale 1 puff into the lungs 2 (two) times daily.  Dispense: 12 g; Refill: 0  2. Hyponatremia  3. Essential hypertension  -     Lisinopril-hydroCHLOROthiazide; Take 1 tablet by mouth daily.  Dispense: 90 tablet; Refill: 1  -     Comp Metabolic Panel (14); Future; Expected date: 03/12/2024  -     CBC, Platelet; No Differential; Future; Expected date: 03/12/2024  -     Labetalol HCl; Take 0.5 tablets (50 mg total) by mouth 2 (two) times daily.  Dispense: 90 tablet; Refill: 1  4. Memory problem  5. Family history of dementia  6. Ischemic cerebrovascular disease  7. Venous angioma of brain (HCC)  8. Mixed hyperlipidemia  -     Atorvastatin Calcium; Take 1 tablet (40 mg total) by mouth daily.  Dispense: 90 tablet; Refill: 1  -     Comp Metabolic Panel (14); Future; Expected date: 03/12/2024  -     CBC, Platelet; No Differential; Future; Expected date: 03/12/2024  -     Lipid Panel; Future; Expected date: 03/12/2024  9. Prediabetes  -     metFORMIN HCl; Take 1 tablet (500 mg total) by mouth 2 (two) times daily with meals.  Dispense: 180 tablet; Refill: 1  -     Comp Metabolic Panel (14); Future; Expected date: 03/12/2024  -     CBC, Platelet; No Differential; Future; Expected date: 03/12/2024  -     TSH W Reflex To Free T4; Future; Expected date: 03/12/2024  -     Hemoglobin A1C; Future; Expected date: 03/12/2024  10. Colon cancer screening  -     Gastro Referral - In Network    Return in about 6 months (around 9/12/2024).    Asthma  - recent increase in frequency of asthma symptoms with changes in weather  - ACT score 13 today, suboptimally controlled  - recommended addition of advair inhaler to her asthma regimen. Will consider seasonal scheduling of this medication for her seasonal exacerbation in symptoms  - continue singulair, xyzal,  and PRN albuterol inhaler    Hyponatremia  - resolved  - BP continues to be well  within goal  - recommended trial of reduced dose of labetalol (50 mg BID)  - recommended monitoring her BP once every 1-2 days at home, and we discussed that her BP goal is <140/90 consistently    Memory symptoms  - strong family history of dementia and Alzheimer's disease  - brain MRI showed signs of migraines vs chronic small vessel ischemic disease and a developmental venous angioma in the R cerebellar hemisphere. She denies headaches, seizures, or other neurological symptoms including but not limited to vision changes, numbness/tingling/weakness, which makes small vessel ischemic disease more likely rather than migraines and indicates that her venous angioma is asymptomatic  - recommended cardiovascular risk factor control including her lipid and BG levels. She is currently taking lipitor and metformin and due for follow-up labs  - recommended adequate sleep (at least 7-8 hours per night), keeping mentally and physically active during the day, and a daily fish oil supplement  - we discussed that there would be a low threshold for initiation of a medication like aricept or namenda given her strong family history of dementia  - recommended monitoring her venous angioma with brain imaging once every 5 years    Patient verbalized understanding of assessment and recommendations. All questions and concerns were addressed.    Electronically signed by Jordana Damon MD

## 2024-03-13 ENCOUNTER — TELEPHONE (OUTPATIENT)
Dept: FAMILY MEDICINE CLINIC | Facility: CLINIC | Age: 54
End: 2024-03-13

## 2024-03-13 NOTE — TELEPHONE ENCOUNTER
Received incoming fax from pharmacy on fluticasone-salmeterol 115-21 MCG/ACT, needs PA. Will place fax in MA folder Key:[id number]

## 2024-03-14 NOTE — TELEPHONE ENCOUNTER
PA submitted via CMM.  KEY: [id number]    Per CMM:  Information regarding your request  Drug is covered by current benefit plan. No further PA activity needed

## 2024-06-12 ENCOUNTER — HOSPITAL ENCOUNTER (OUTPATIENT)
Age: 54
Discharge: HOME | End: 2024-06-12
Payer: COMMERCIAL

## 2024-06-12 VITALS
OXYGEN SATURATION: 97 % | HEART RATE: 73 BPM | DIASTOLIC BLOOD PRESSURE: 82 MMHG | HEIGHT: 64 IN | RESPIRATION RATE: 18 BRPM | BODY MASS INDEX: 31.58 KG/M2 | TEMPERATURE: 97 F | SYSTOLIC BLOOD PRESSURE: 165 MMHG | WEIGHT: 185 LBS

## 2024-06-12 DIAGNOSIS — J01.10 ACUTE NON-RECURRENT FRONTAL SINUSITIS: Primary | ICD-10-CM

## 2024-06-12 PROCEDURE — 99204 OFFICE O/P NEW MOD 45 MIN: CPT

## 2024-06-12 PROCEDURE — 99213 OFFICE O/P EST LOW 20 MIN: CPT

## 2024-06-12 RX ORDER — CODEINE PHOSPHATE AND GUAIFENESIN 10; 100 MG/5ML; MG/5ML
5 SOLUTION ORAL EVERY 6 HOURS PRN
Qty: 180 ML | Refills: 0 | Status: SHIPPED | OUTPATIENT
Start: 2024-06-12

## 2024-06-12 RX ORDER — DOXYCYCLINE HYCLATE 100 MG/1
100 CAPSULE ORAL 2 TIMES DAILY
Qty: 14 CAPSULE | Refills: 0 | Status: SHIPPED | OUTPATIENT
Start: 2024-06-12 | End: 2024-06-19

## 2024-06-12 RX ORDER — FLUCONAZOLE 150 MG/1
150 TABLET ORAL DAILY
Qty: 2 TABLET | Refills: 0 | Status: SHIPPED | OUTPATIENT
Start: 2024-06-12

## 2024-06-12 RX ORDER — METHYLPREDNISOLONE 4 MG/1
TABLET ORAL
Qty: 21 TABLET | Refills: 0 | Status: SHIPPED | OUTPATIENT
Start: 2024-06-12

## 2024-06-12 NOTE — ED PROVIDER NOTES
Patient Seen in: [facility]      History     Chief Complaint   Patient presents with    Cough/URI     Stated Complaint: sinus infection    Subjective:   54-year-old female presents to immediate care for sinus pain and pressure and cough.  Patient states symptoms started Thursday with scratchy throat has been continuing on.  She has been using her montelukast, inhaler, Xyzal with little relief.            Objective:   Past Medical History:    Allergic rhinitis    Asthma (HCC)    Essential hypertension    Fatigue    HTN (hypertension)    Hyperlipidemia    Hypercholesterolemia    Lateral epicondylitis  of elbow    Mild intermittent asthma without complication (HCC)    Prediabetes    Tobacco dependency              Past Surgical History:   Procedure Laterality Date    Adenoidectomy      Femur/knee surg unlisted      \"L knee surg\"    Other surgical history      Knee    Tonsillectomy                  Social History     Socioeconomic History    Marital status:    Occupational History    Occupation:    Tobacco Use    Smoking status: Former     Current packs/day: 0.00     Types: Cigarettes     Quit date: 2012     Years since quittin.8    Smokeless tobacco: Never   Vaping Use    Vaping status: Never Used   Substance and Sexual Activity    Alcohol use: Yes     Alcohol/week: 5.0 standard drinks of alcohol     Types: 5 Glasses of wine per week     Comment: 1 glass of wine with dinner, 2 glasses on the weekends    Drug use: No    Sexual activity: Yes     Partners: Male   Other Topics Concern    Caffeine Concern Yes    Stress Concern Yes    Weight Concern No    Special Diet No    Exercise No    Seat Belt Yes     Social Determinants of Health      [facility]    Social Connections    [facility]    Housing Stability              Review of  Systems   Constitutional: Negative.    Respiratory: Negative.     Cardiovascular: Negative.    Gastrointestinal: Negative.    Skin: Negative.    Neurological: Negative.        Positive for stated complaint: sinus infection  Other systems are as noted in HPI.  Constitutional and vital signs reviewed.      All other systems reviewed and negative except as noted above.    Physical Exam     ED Triage Vitals [06/12/24 0920]   BP (!) 165/82   Pulse 73   Resp 18   Temp 97.1 °F (36.2 °C)   Temp src Temporal   SpO2 97 %   O2 Device None (Room air)       Current Vitals:   Vital Signs  BP: (!) 165/82  Pulse: 73  Resp: 18  Temp: 97.1 °F (36.2 °C)  Temp src: Temporal    Oxygen Therapy  SpO2: 97 %  O2 Device: None (Room air)            Physical Exam  Vitals and nursing note reviewed.   Constitutional:       General: She is not in acute distress.  HENT:      Head: Normocephalic.   Cardiovascular:      Rate and Rhythm: Normal rate.   Pulmonary:      Effort: Pulmonary effort is normal.   Musculoskeletal:         General: Normal range of motion.   Skin:     General: Skin is warm and dry.   Neurological:      General: No focal deficit present.      Mental Status: She is alert and oriented to person, place, and time.               ED Course   Labs Reviewed - No data to display                   MDM      Medical Decision Making  Pertinent Labs & Imaging studies reviewed. (See chart for details).  Patient coming in with sinus pain and pressure congestion, cough.   Differential diagnosis includes sinusitis, pneumonia, viral URI  Will treat for sinusitis.  Will discharge on Augmentin, Medrol Dosepak, Cheratussin. Patient is comfortable with this plan.     Overall Pt looks good. Non-toxic, well-hydrated and in no respiratory distress. Vital signs are reassuring. Exam is reassuring. I do not believe pt requires and additional diagnostic studies or intervention. I believe pt can be discharged home to continue evaluation as an outpatient.  Follow-up provider given. Discharge instructions given and reviewed. Return for any problems. All understand and agreewith the plan.     Please note that this report has been produced using speech recognition software and may contain errors related to that system including, but not limited to, errors in grammar, punctuation, and spelling, as well as words and phrases that possibly may have been recognized inappropriately. If there are any questions or concerns, contact the dictating provider for clarification.       The 21st Century Cures Act makes Medical Notes like these available to patients in the interest of transparency.  However, be advised this is a medical document.  It is intended as peer to peer communication.  It is written in medical language and may contain abbreviations or verbiage that are unfamiliar.  It may appear blunt or direct.  Medical documents are intended to carry relevant information, facts as evident, and the clinical opinion of the practitioner      Problems Addressed:  Acute non-recurrent frontal sinusitis: acute illness or injury    Risk  OTC drugs.  Prescription drug management.        Disposition and Plan     Clinical Impression:  1. Acute non-recurrent frontal sinusitis         Disposition:  Discharge  6/12/2024  9:48 am    Follow-up:  Jordana Damon MD  [id number] [place] [id number]  [phone]                Medications Prescribed:  Discharge Medication List as of 6/12/2024  9:50 AM        START taking these medications    Details   fluconazole (DIFLUCAN) 150 MG Oral Tab Take 1 tablet (150 mg total) by mouth daily. Repeat dose every 72 hours if not improved, Normal, Disp-2 tablet, R-0      doxycycline 100 MG Oral Cap Take 1 capsule (100 mg total) by mouth 2 (two) times daily for 7 days., Normal, Disp-14 capsule, R-0      guaiFENesin-codeine (CHERATUSSIN AC) 100-10 MG/5ML Oral Solution Take 5 mL by mouth every 6 (six) hours as needed for cough., Normal,  Disp-180 mL, R-0      methylPREDNISolone (MEDROL) 4 MG Oral Tablet Therapy Pack Dosepack: take as directed, Normal, Disp-21 tablet, R-0

## 2024-07-16 DIAGNOSIS — N95.1 SYMPTOMATIC MENOPAUSAL OR FEMALE CLIMACTERIC STATES: ICD-10-CM

## 2024-07-17 RX ORDER — ESTROGEN,CON/M-PROGEST ACET 0.3-1.5MG
1 TABLET ORAL DAILY
Qty: 84 TABLET | Refills: 3 | OUTPATIENT
Start: 2024-07-17

## 2024-07-17 NOTE — TELEPHONE ENCOUNTER
Last OV: 09/12/23  Last refill date: 11/20/23  Follow-up: 09/2024  Next appt.: None    Needs to schedule annual. MyChart message sent.

## 2024-07-22 DIAGNOSIS — J30.2 SEASONAL ALLERGIES: ICD-10-CM

## 2024-07-23 RX ORDER — FLUTICASONE PROPIONATE 50 MCG
2 SPRAY, SUSPENSION (ML) NASAL AS NEEDED
Qty: 48 G | Refills: 1 | Status: SHIPPED | OUTPATIENT
Start: 2024-07-23

## 2024-07-23 NOTE — TELEPHONE ENCOUNTER
Requested Renewals     Name from pharmacy: FLUTICASONE PROP NASAL SPRAY 16GM 50MCG         Will file in chart as: FLUTICASONE PROPIONATE 50 MCG/ACT Nasal Suspension    Sig: USE 2 SPRAYS NASALLY AS NEEDED    Disp: 48 g    Refills: 3    Start: 7/22/2024    Class: Normal    Non-formulary For: Seasonal allergies    Last ordered: 8 months ago (10/30/2023) by Jordana Damon MD    Last refill: 4/24/2024    Rx #: [id number]    Allergy Medication Protocol Apober6407/22/2024 11:31 PM   Protocol Details In person appointment or virtual visit in the past 12 mos or appointment in next 3 mos           No future appointments.  LOV: 3/12/24  RTC: 6 months    RX sent as it passed protocol.

## 2024-08-19 DIAGNOSIS — N95.1 SYMPTOMATIC MENOPAUSAL OR FEMALE CLIMACTERIC STATES: ICD-10-CM

## 2024-08-19 RX ORDER — ESTROGEN,CON/M-PROGEST ACET 0.3-1.5MG
1 TABLET ORAL DAILY
Qty: 90 TABLET | Refills: 0 | Status: SHIPPED | OUTPATIENT
Start: 2024-08-19

## 2024-08-19 NOTE — TELEPHONE ENCOUNTER
Last OV: 9/12/2023  Last refill date:   Medication Quantity Refills Start End   Conj Estrog-Medroxyprogest Ace (PREMPRO) 0.3-1.5 MG Oral Tab 90 tablet 2 11/20/2023 --   Sig:   Take 1 tablet by mouth daily.       Follow-up: RTC in 1 year  Next appt.: 11/5/2024  Refill sent per protocol.

## 2024-08-20 RX ORDER — MONTELUKAST SODIUM 10 MG/1
10 TABLET ORAL NIGHTLY
Qty: 90 TABLET | Refills: 0 | Status: SHIPPED | OUTPATIENT
Start: 2024-08-20

## 2024-09-08 DIAGNOSIS — I10 ESSENTIAL HYPERTENSION: ICD-10-CM

## 2024-09-11 RX ORDER — LABETALOL 100 MG/1
50 TABLET, FILM COATED ORAL 2 TIMES DAILY
Qty: 90 TABLET | Refills: 0 | Status: SHIPPED | OUTPATIENT
Start: 2024-09-11

## 2024-09-16 DIAGNOSIS — I10 ESSENTIAL HYPERTENSION: ICD-10-CM

## 2024-09-16 DIAGNOSIS — R73.03 PREDIABETES: ICD-10-CM

## 2024-09-17 RX ORDER — LISINOPRIL AND HYDROCHLOROTHIAZIDE 12.5; 2 MG/1; MG/1
1 TABLET ORAL DAILY
Qty: 90 TABLET | Refills: 0 | Status: SHIPPED | OUTPATIENT
Start: 2024-09-17

## 2024-09-17 NOTE — TELEPHONE ENCOUNTER
Requesting     Disp Refills Start End     lisinopril-hydroCHLOROthiazide 20-12.5 MG Oral Tab 90 tablet 1 3/12/2024 --    Sig - Route: Take 1 tablet by mouth daily. - Oral    Sent to pharmacy as: Lisinopril-hydroCHLOROthiazide 20-12.5 MG Oral Tablet (Zestoretic)        Disp Refills Start End     metFORMIN 500 MG Oral Tab 180 tablet 1 3/12/2024 --    Sig - Route: Take 1 tablet (500 mg total) by mouth 2 (two) times daily with meals. - Oral    Sent to pharmacy as: metFORMIN HCl 500 MG Oral Tablet (Glucophage)      LOV: 3/12/2024 for test results and asthma  3. Essential hypertension  -     Lisinopril-hydroCHLOROthiazide; Take 1 tablet by mouth daily.  Dispense: 90 tablet; Refill: 1  9. Prediabetes  -     metFORMIN HCl; Take 1 tablet (500 mg total) by mouth 2 (two) times daily with meals.  Dispense: 180 tablet; Refill: 1  RTC: Return in about 6 months (around 9/12/2024).     Last Relevant Labs: 10/25/2023    Filled:     Dispensed Written Strength Quantity Refills Days Supply Provider Pharmacy    LISINOPRIL-HCTZ 20-12.5 MG TAB 06/19/2024 03/12/2024 20-12.5 MG 90 tablet  90 Jordana Damon MD [facility]       Dispensed Written Strength Quantity Refills Days Supply Provider Pharmacy    METFORMIN  MG TABLET 06/19/2024 03/12/2024 500  tablet  90 Jordana Damon MD [facility]       Future Appointments   Date Time Provider Department Center   11/5/2024 11:30 AM Flavia Patten MD [facility] [address]     Rx sent  MCM sent to patient to schedule 6 month follow up

## 2024-09-23 DIAGNOSIS — E78.2 MIXED HYPERLIPIDEMIA: ICD-10-CM

## 2024-09-24 RX ORDER — ATORVASTATIN CALCIUM 40 MG/1
40 TABLET, FILM COATED ORAL DAILY
Qty: 90 TABLET | Refills: 0 | Status: SHIPPED | OUTPATIENT
Start: 2024-09-24

## 2024-09-24 NOTE — TELEPHONE ENCOUNTER
Requested Renewals     Name from pharmacy: ATORVASTATIN TABS 40MG         Will file in chart as: ATORVASTATIN 40 MG Oral Tab    Sig: TAKE 1 TABLET DAILY    Disp: 90 tablet    Refills: 3    Start: 9/23/2024    Class: Normal    Non-formulary For: Mixed hyperlipidemia    Last ordered: 6 months ago (3/12/2024) by Jordana Damon MD    Last refill: 6/26/2024    Rx #: [id number]    Cholesterol Medication Protocol [id number]/23/2024 11:34 PM   Protocol Details ALT < 80    ALT resulted within past year    Lipid panel within past 12 months    In person appointment or virtual visit in the past 12 mos or appointment in next 3 mos             Future Appointments   Date Time Provider Department Center   11/5/2024 11:30 AM Flavia Patten MD [facility] [address]     LOV: 3/12/24  RTC: 6 months

## 2024-10-20 DIAGNOSIS — N95.1 SYMPTOMATIC MENOPAUSAL OR FEMALE CLIMACTERIC STATES: ICD-10-CM

## 2024-10-21 RX ORDER — ESTROGEN,CON/M-PROGEST ACET 0.3-1.5MG
1 TABLET ORAL DAILY
Qty: 84 TABLET | Refills: 3 | OUTPATIENT
Start: 2024-10-21

## 2024-10-21 NOTE — TELEPHONE ENCOUNTER
Last annual exam: 9/12/2023  Follow-up recommended: RTC in 1 year  Last refill date:   Medication Quantity Refills Start End   Conj Estrog-Medroxyprogest Ace (PREMPRO) 0.3-1.5 MG Oral Tab 90 tablet 0 8/19/2024 --   Sig:   Take 1 tablet by mouth daily.       Next appt.: 11/5/2024   Patient should have enough supply to last until her appointment.  Refill denied at this time.

## 2024-11-05 ENCOUNTER — OFFICE VISIT (OUTPATIENT)
Dept: OBGYN CLINIC | Facility: CLINIC | Age: 54
End: 2024-11-05
Payer: COMMERCIAL

## 2024-11-05 VITALS
BODY MASS INDEX: 32.78 KG/M2 | DIASTOLIC BLOOD PRESSURE: 80 MMHG | HEIGHT: 64 IN | HEART RATE: 70 BPM | WEIGHT: 192 LBS | SYSTOLIC BLOOD PRESSURE: 130 MMHG

## 2024-11-05 DIAGNOSIS — Z12.4 SCREENING FOR CERVICAL CANCER: ICD-10-CM

## 2024-11-05 DIAGNOSIS — Z12.31 ENCOUNTER FOR SCREENING MAMMOGRAM FOR MALIGNANT NEOPLASM OF BREAST: ICD-10-CM

## 2024-11-05 DIAGNOSIS — N95.1 VASOMOTOR SYMPTOMS DUE TO MENOPAUSE: ICD-10-CM

## 2024-11-05 DIAGNOSIS — Z01.419 ENCOUNTER FOR WELL WOMAN EXAM WITH ROUTINE GYNECOLOGICAL EXAM: Primary | ICD-10-CM

## 2024-11-05 PROCEDURE — 3079F DIAST BP 80-89 MM HG: CPT | Performed by: OBSTETRICS & GYNECOLOGY

## 2024-11-05 PROCEDURE — 99459 PELVIC EXAMINATION: CPT | Performed by: OBSTETRICS & GYNECOLOGY

## 2024-11-05 PROCEDURE — 3075F SYST BP GE 130 - 139MM HG: CPT | Performed by: OBSTETRICS & GYNECOLOGY

## 2024-11-05 PROCEDURE — 87624 HPV HI-RISK TYP POOLED RSLT: CPT | Performed by: OBSTETRICS & GYNECOLOGY

## 2024-11-05 PROCEDURE — 3008F BODY MASS INDEX DOCD: CPT | Performed by: OBSTETRICS & GYNECOLOGY

## 2024-11-05 PROCEDURE — 99396 PREV VISIT EST AGE 40-64: CPT | Performed by: OBSTETRICS & GYNECOLOGY

## 2024-11-05 RX ORDER — ESTROGEN,CON/M-PROGEST ACET 0.45-1.5MG
1 TABLET ORAL DAILY
Qty: 90 TABLET | Refills: 3 | Status: SHIPPED | OUTPATIENT
Start: 2024-11-05

## 2024-11-05 NOTE — PROGRESS NOTES
[facility]  Obstetrics and Gynecology    Subjective:     Kelly D Baumgarten is a 54 year old  who presents for an annual gyn exam. Patient complaints include questions about menopause. Hot flushes have been worse again despite using PremPro.    Patient's last menstrual period was 2019.   Menarche: 10 (2024 11:47 AM)  Period Cycle (Days): menopause (2024 11:47 AM)  Period Duration (Days): n/a (2024 11:47 AM)  Period Flow: n/a (2024 11:47 AM)  Use of Birth Control (if yes, specify type): Postmenopausal (2024 11:47 AM)  Hx Prior Abnormal Pap: No (2024 11:47 AM)  Pap Date: 10/25/21 (2024 11:47 AM)  Pap Result Notes: wnl (2024 11:47 AM)     Dyspareunia: No.    Difficulty with bowel or bladder function: No.   History of STDs: Yes HPV in   Smoker: No.  Safe at home: Yes.  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    Screening:  Pap smear: neg co-testing   Mammogram:  2024 BI-RADS 1  Colonoscopy: UTD   DEXA: n/a         Review of Systems  Constitutional: Denies fever/chills, weight loss, fatigue, weakness, or sweating  HEENT: Denies headache, hearing loss or tinnitus, ear pain or discharge, nosebleeds, congestion, sore throat  Eye: Denies visual changes, eye pain or discharge or redness  Cardiovascular: Denies chest pain, palpitations  Pulmonary: Denies cough, shortness of breath, wheezing  Breast: denies breast pain or palpable mass, skin changes, nipple discharge  GI: Denies nausea, vomiting, diarrhea, constipation, heartburn, hemachezia  : Denies dysuria, urgency, frequency, hematuria, flank pain  Musculoskeletal: Denies myalgias, pain in neck or back or joints  Skin: Denies rash, itching  Endocrine: Denies easy bruising or bleeding, increased thirst  Neuro: Denies dizziness, paraesthesias, focal weakness, seizures, loss of consciousness  Psych: Denies depression, anxiety, suicidal ideations, hallucinations, insomnia     Past Medical  History   Past Medical History:    Allergic rhinitis    Asthma (HCC)    Essential hypertension    Fatigue    HTN (hypertension)    Hyperlipidemia    Hypercholesterolemia    Lateral epicondylitis  of elbow    Mild intermittent asthma without complication (HCC)    Prediabetes    Tobacco dependency         Past Obstetric History   OB History    Para Term  AB Living   0 0 0 0 0 0   SAB IAB Ectopic Multiple Live Births   0 0 0 0 0       Past Surgical History   Past Surgical History:   Procedure Laterality Date    Adenoidectomy      Femur/knee surg unlisted      \"L knee surg\"    Other surgical history      Knee    Tonsillectomy          Family History   Family History   Problem Relation Age of Onset    Other (Alzheimers) Mother     High Blood Pressure Mother     Depression Mother     Asthma Mother     Dementia Mother     Psychiatric Mother     Hypertension Father     Diabetes Father     Stroke Father     Other (Alzheimers) Maternal Grandmother     Other (Heart problems) Maternal Grandmother     Arthritis Maternal Grandmother     Stroke Maternal Grandmother     Dementia Maternal Grandmother     Diabetes Maternal Grandfather     Stroke Maternal Grandfather     Diabetes Maternal Grandfather     Stroke Maternal Grandfather     Other (Heart problems) Maternal Grandfather     Other (Alzheimers) Paternal Grandmother     Diabetes Paternal Grandmother     High Blood Pressure Paternal Grandmother     Asthma Sister         Social History   Social History     Socioeconomic History    Marital status:    Occupational History    Occupation:    Tobacco Use    Smoking status: Former     Current packs/day: 0.00     Types: Cigarettes     Quit date: 2012     Years since quittin.2    Smokeless tobacco: Never   Vaping Use    Vaping status: Never Used   Substance and Sexual Activity    Alcohol use: Yes     Alcohol/week: 7.0 standard drinks of alcohol     Types: 7 Glasses of wine per week      Comment: 1 glass of wine with dinner, 2 glasses on the weekends    Drug use: No    Sexual activity: Yes     Partners: Male   Other Topics Concern    Caffeine Concern Yes    Stress Concern No    Weight Concern No    Special Diet No    Exercise Yes    Seat Belt Yes     Social Drivers of Health      [facility], [facility]    Social Connections    Received from [facility], [facility]    Housing Stability        Medications   Current Outpatient Medications on File Prior to Visit   Medication Sig Dispense Refill    atorvastatin 40 MG Oral Tab TAKE 1 TABLET DAILY 90 tablet 0    lisinopril-hydroCHLOROthiazide 20-12.5 MG Oral Tab TAKE 1 TABLET DAILY 90 tablet 0    metFORMIN 500 MG Oral Tab TAKE 1 TABLET TWICE A DAY WITH MEALS 180 tablet 0    labetalol 100 MG Oral Tab TAKE ONE-HALF (1/2) TABLET TWICE A DAY 90 tablet 0    montelukast 10 MG Oral Tab TAKE 1 TABLET NIGHTLY 90 tablet 0    Conj Estrog-Medroxyprogest Ace (PREMPRO) 0.3-1.5 MG Oral Tab Take 1 tablet by mouth daily. 90 tablet 0    fluticasone propionate 50 MCG/ACT Nasal Suspension USE 2 SPRAYS NASALLY AS NEEDED 48 g 1    fluconazole (DIFLUCAN) 150 MG Oral Tab Take 1 tablet (150 mg total) by mouth daily. Repeat dose every 72 hours if not improved 2 tablet 0    methylPREDNISolone (MEDROL) 4 MG Oral Tablet Therapy Pack Dosepack: take as directed 21 tablet 0    albuterol 108 (90 Base) MCG/ACT Inhalation Aero Soln Inhale 2 puffs into the lungs every 4 (four) hours as needed for Wheezing or Shortness of Breath. 6.7 g 0    Glucose Blood (FREESTYLE LITE TEST) In Vitro Strip 1 strip by In Vitro route daily. 100 strip 1    azelastine 0.1 % Nasal Solution 2 sprays by Nasal route 2 (two) times daily.      Blood Glucose Monitoring Suppl (FREESTYLE LITE) Does not apply Device Check glucose daily. 1 Device 0    multivitamin Oral Tab Take 1 tablet by mouth daily.      Levocetirizine  Dihydrochloride (XYZAL) 5 MG Oral Tab Take 1 tablet (5 mg total) by mouth daily.       No current facility-administered medications on file prior to visit.       Allergies   Allergies   Allergen Reactions    Pcn [Penicillins] OTHER (SEE COMMENTS)     Yeast infection    Seasonal           Objective:     Vitals:    24 1144   BP: 130/80   Pulse: 70   Weight: 192 lb (87.1 kg)   Height: 64\"       Body mass index is 32.96 kg/m².    GEN: AAOx3, NAD, appears well, appears stated age  HEENT: normocephalic, atraumatic, thyroid symmetric without enlargement or nodularity  BREAST: bilaterally symmetric with no palpable masses, no nipple discharge, no skin changes  CV: RRR  PULM: CTAB  ABD: soft, NT, ND, no rebound or guarding  EXT: no c/c/e or tenderness  NEURO: CN 2-12 grossly intact  PELVIC:   Vulva: NEFG.   Vagina: Estrogenized, physiologic discharge.    Cervix: No lesions or tenderness.     Uterus: anteverted, 6 week size, firm, mobile, nontender.     Adnexa: No masses or tenderness.     Rectal: Anus and perineum are normal.      Assessment:     Kelly D Baumgarten is a 54 year old  seen for well-women gyn exam.    Plan:     -- vasomotor symptoms of menopause: discussed increasing dose of PremPro, refill sent  -- cervical cancer screening: Pap with HPV co-testing done today   -- breast cancer screening: continue annual CBE and mammograms  -- STD screening ordered: No  -- Discussed further preventative care with PCP, staying up to date with screening and vaccinations, and maintaining healthy diet and exercise.      -- Follow up in 1 year for annual exam or sooner as needed    Flavia Zapata MD  [facility]  2024 12:03 PM

## 2024-11-06 PROBLEM — N95.1 VASOMOTOR SYMPTOMS DUE TO MENOPAUSE: Status: ACTIVE | Noted: 2024-11-06

## 2024-11-06 LAB — HPV E6+E7 MRNA CVX QL NAA+PROBE: NEGATIVE

## 2024-11-11 LAB
.: NORMAL
.: NORMAL

## 2024-11-16 RX ORDER — MONTELUKAST SODIUM 10 MG/1
10 TABLET ORAL NIGHTLY
Qty: 90 TABLET | Refills: 0 | Status: SHIPPED | OUTPATIENT
Start: 2024-11-16

## 2024-11-16 NOTE — TELEPHONE ENCOUNTER
Requested Renewals     Name from pharmacy: MONTELUKAST SODIUM TABS 10MG         Will file in chart as: MONTELUKAST 10 MG Oral Tab    Sig: TAKE 1 TABLET NIGHTLY    Disp: 90 tablet    Refills: 3    Start: 11/14/2024    Class: Normal    Non-formulary    Last ordered: 2 months ago (8/20/2024) by Jordana Damon MD    Last refill: 8/20/2024    Rx #: [id number]    Asthma & COPD Medication Protocol Vijxfg2011/14/2024 11:38 PM   Protocol Details ACT Score greater than or equal to 20    Appointment in past 6 or next 3 months    ACT recorded in the last 12 months             Future Appointments   Date Time Provider Department Center   3/13/2025 10:00 AM [facility] [place]     LOV: 3/12/24  RTC: 6 months  ACT: 13 on last OV  Labs ordered.  Last physical done 9/18/23    MCM sent to patient to schedule annual exam and have labs done as well.    Medication pended for review.

## 2024-12-08 DIAGNOSIS — I10 ESSENTIAL HYPERTENSION: ICD-10-CM

## 2024-12-09 RX ORDER — LABETALOL 100 MG/1
TABLET, FILM COATED ORAL
Qty: 90 TABLET | Refills: 0 | Status: SHIPPED | OUTPATIENT
Start: 2024-12-09

## 2024-12-09 NOTE — TELEPHONE ENCOUNTER
Requested Renewals     Name from pharmacy: LABETALOL TABS 100MG         Will file in chart as: LABETALOL 100 MG Oral Tab    Sig: TAKE ONE-HALF (1/2) TABLET TWICE A DAY (NO FURTHER REFILLS. NEEDS APPOINTMENT)    Disp: 90 tablet    Refills: 3    Start: 12/8/2024    Class: Normal    Non-formulary For: Essential hypertension    Last ordered: 2 months ago (9/11/2024) by Jordana Damon MD    Last refill: 9/13/2024    Rx #: [id number]    Hypertension Medications Protocol Damljh9412/08/2024 11:42 PM   Protocol Details CMP or BMP in past 12 months    Last BP reading less than 140/90    In person appointment or virtual visit in the past 12 mos or appointment in next 3 mos    EGFRCR or GFRNAA > 50             Future Appointments   Date Time Provider Department Center   3/13/2025 10:00 AM [facility] [place]     LOV: 3/12/24  RTC: 6 months

## 2024-12-15 DIAGNOSIS — R73.03 PREDIABETES: ICD-10-CM

## 2024-12-15 DIAGNOSIS — I10 ESSENTIAL HYPERTENSION: ICD-10-CM

## 2024-12-18 NOTE — TELEPHONE ENCOUNTER
Requesting Metformin 500mg  Filled: 9/17/24 #180 with 0 refills    Requesting Lisinopril-Hydrochlorothiazide 20/12.5mg  Filled: 9/17/24 #90 with 0 refills    LOV: 3/12/24  RTC: 6 months  Last Relevant Labs: 10/25/23    Future Appointments   Date Time Provider Department Center   3/13/2025 10:00 AM PF [facility]     Diabetes Medication Protocol Yiwgty4012/18/2024 12:49 PM   Protocol Details   Last A1C < 7.5 and within past 6 months    In person appointment or virtual visit in the past 6 mos or appointment in next 3 mos    Microalbumin procedure in past 12 months or taking ACE/ARB    EGFRCR or GFRNAA > 50    GFR in the past 12 months     Patient is due for follow up/labs. Please schedule appointment.  Can send refill once appt is scheduled.

## 2024-12-19 RX ORDER — LISINOPRIL AND HYDROCHLOROTHIAZIDE 12.5; 2 MG/1; MG/1
1 TABLET ORAL DAILY
Qty: 90 TABLET | Refills: 0 | Status: SHIPPED | OUTPATIENT
Start: 2024-12-19

## 2024-12-19 NOTE — TELEPHONE ENCOUNTER
Future Appointments   Date Time Provider Department Center   1/7/2025 12:30 PM Jordana Damon MD [facility] [address]   3/13/2025 10:00 AM PF [facility]

## 2024-12-19 NOTE — TELEPHONE ENCOUNTER
Left message on machine for patient to call office to schedule an appointment for follow up and to do labs.prior.

## 2024-12-22 DIAGNOSIS — E78.2 MIXED HYPERLIPIDEMIA: ICD-10-CM

## 2024-12-24 RX ORDER — ATORVASTATIN CALCIUM 40 MG/1
40 TABLET, FILM COATED ORAL DAILY
Qty: 90 TABLET | Refills: 0 | Status: SHIPPED | OUTPATIENT
Start: 2024-12-24

## 2024-12-24 NOTE — TELEPHONE ENCOUNTER
Requesting Atorvastatin 40mg  LOV: 3/12/24  RTC: 6 months  Last Relevant Labs: 10/25/23  Filled: 9/24/24 #90 with 1 refills    Future Appointments   Date Time Provider Department Center   1/7/2025 12:30 PM Jordana Damon MD [facility] [address] [facility] [address]   3/13/2025 10:00 AM [facility] [place]     Cholesterol Medication Protocol Ofaowi9912/24/2024 09:54 AM   Protocol Details   ALT < 80    ALT resulted within past year    Lipid panel within past 12 months    In person appointment or virtual visit in the past 12 mos or appointment in next 3 mos     Patient has upcoming appointment and has been advised to complete labs    Failed protocol:  Rx pended and routed for approval/denial

## 2025-01-19 DIAGNOSIS — J30.2 SEASONAL ALLERGIES: ICD-10-CM

## 2025-01-21 RX ORDER — FLUTICASONE PROPIONATE 50 MCG
2 SPRAY, SUSPENSION (ML) NASAL AS NEEDED
Qty: 48 G | Refills: 1 | Status: SHIPPED | OUTPATIENT
Start: 2025-01-21

## 2025-01-21 NOTE — TELEPHONE ENCOUNTER
Requested Renewals     Name from pharmacy: FLUTICASONE PROPIONATE NASAL SPRAY 50MCG         Will file in chart as: FLUTICASONE PROPIONATE 50 MCG/ACT Nasal Suspension    Sig: USE 2 SPRAYS NASALLY AS NEEDED    Disp: 48 g    Refills: 3    Start: 1/19/2025    Class: Normal    Non-formulary For: Seasonal allergies    Last ordered: 6 months ago (7/23/2024) by Jordana Damon MD    Last refill: 10/21/2024    Rx #: [id number]    Allergy Medication Protocol Qwdfhp4201/19/2025 11:56 PM   Protocol Details In person appointment or virtual visit in the past 12 mos or appointment in next 3 mos    Medication is active on med list             Future Appointments   Date Time Provider Department Center   1/24/2025  2:00 PM Jordana Damon MD [facility] [address]   3/13/2025 10:00 [facility] [place]     LOV: 3/12/24  RTC: 6 months    RX sent to pharmacy.

## 2025-01-22 ENCOUNTER — LAB ENCOUNTER (OUTPATIENT)
Dept: LAB | Age: 55
End: 2025-01-22
Attending: STUDENT IN AN ORGANIZED HEALTH CARE EDUCATION/TRAINING PROGRAM
Payer: COMMERCIAL

## 2025-01-22 DIAGNOSIS — I10 ESSENTIAL HYPERTENSION: ICD-10-CM

## 2025-01-22 DIAGNOSIS — E78.2 MIXED HYPERLIPIDEMIA: ICD-10-CM

## 2025-01-22 DIAGNOSIS — R73.03 PREDIABETES: ICD-10-CM

## 2025-01-22 LAB
ALBUMIN SERPL-MCNC: 4.6 G/DL (ref 3.2–4.8)
ALBUMIN/GLOB SERPL: 1.8 {RATIO} (ref 1–2)
ALP LIVER SERPL-CCNC: 91 U/L
ALT SERPL-CCNC: 10 U/L
ANION GAP SERPL CALC-SCNC: 9 MMOL/L (ref 0–18)
AST SERPL-CCNC: 15 U/L (ref ?–34)
BILIRUB SERPL-MCNC: 0.5 MG/DL (ref 0.3–1.2)
BUN BLD-MCNC: 9 MG/DL (ref 9–23)
CALCIUM BLD-MCNC: 9.7 MG/DL (ref 8.7–10.6)
CHLORIDE SERPL-SCNC: 101 MMOL/L (ref 98–112)
CHOLEST SERPL-MCNC: 220 MG/DL (ref ?–200)
CO2 SERPL-SCNC: 29 MMOL/L (ref 21–32)
CREAT BLD-MCNC: 0.74 MG/DL
EGFRCR SERPLBLD CKD-EPI 2021: 96 ML/MIN/1.73M2 (ref 60–?)
ERYTHROCYTE [DISTWIDTH] IN BLOOD BY AUTOMATED COUNT: 12.6 %
EST. AVERAGE GLUCOSE BLD GHB EST-MCNC: 128 MG/DL (ref 68–126)
FASTING PATIENT LIPID ANSWER: YES
FASTING STATUS PATIENT QL REPORTED: YES
GLOBULIN PLAS-MCNC: 2.6 G/DL (ref 2–3.5)
GLUCOSE BLD-MCNC: 141 MG/DL (ref 70–99)
HBA1C MFR BLD: 6.1 % (ref ?–5.7)
HCT VFR BLD AUTO: 39 %
HDLC SERPL-MCNC: 83 MG/DL (ref 40–59)
HGB BLD-MCNC: 13.3 G/DL
LDLC SERPL CALC-MCNC: 117 MG/DL (ref ?–100)
MCH RBC QN AUTO: 32.3 PG (ref 26–34)
MCHC RBC AUTO-ENTMCNC: 34.1 G/DL (ref 31–37)
MCV RBC AUTO: 94.7 FL
NONHDLC SERPL-MCNC: 137 MG/DL (ref ?–130)
OSMOLALITY SERPL CALC.SUM OF ELEC: 289 MOSM/KG (ref 275–295)
PLATELET # BLD AUTO: 329 10(3)UL (ref 150–450)
POTASSIUM SERPL-SCNC: 4 MMOL/L (ref 3.5–5.1)
PROT SERPL-MCNC: 7.2 G/DL (ref 5.7–8.2)
RBC # BLD AUTO: 4.12 X10(6)UL
SODIUM SERPL-SCNC: 139 MMOL/L (ref 136–145)
TRIGL SERPL-MCNC: 116 MG/DL (ref 30–149)
TSI SER-ACNC: 3.04 UIU/ML (ref 0.55–4.78)
VLDLC SERPL CALC-MCNC: 20 MG/DL (ref 0–30)
WBC # BLD AUTO: 6.4 X10(3) UL (ref 4–11)

## 2025-01-22 PROCEDURE — 85027 COMPLETE CBC AUTOMATED: CPT

## 2025-01-22 PROCEDURE — 84443 ASSAY THYROID STIM HORMONE: CPT

## 2025-01-22 PROCEDURE — 36415 COLL VENOUS BLD VENIPUNCTURE: CPT

## 2025-01-22 PROCEDURE — 80053 COMPREHEN METABOLIC PANEL: CPT

## 2025-01-22 PROCEDURE — 83036 HEMOGLOBIN GLYCOSYLATED A1C: CPT

## 2025-01-22 PROCEDURE — 80061 LIPID PANEL: CPT

## 2025-01-24 ENCOUNTER — OFFICE VISIT (OUTPATIENT)
Dept: FAMILY MEDICINE CLINIC | Facility: CLINIC | Age: 55
End: 2025-01-24
Payer: COMMERCIAL

## 2025-01-24 VITALS
BODY MASS INDEX: 32.52 KG/M2 | SYSTOLIC BLOOD PRESSURE: 144 MMHG | RESPIRATION RATE: 16 BRPM | OXYGEN SATURATION: 98 % | HEART RATE: 68 BPM | DIASTOLIC BLOOD PRESSURE: 78 MMHG | TEMPERATURE: 98 F | HEIGHT: 64 IN | WEIGHT: 190.5 LBS

## 2025-01-24 DIAGNOSIS — Z00.00 WELL ADULT EXAM: Primary | ICD-10-CM

## 2025-01-24 DIAGNOSIS — Z13.31 NEGATIVE DEPRESSION SCREENING: ICD-10-CM

## 2025-01-24 DIAGNOSIS — I67.82 ISCHEMIC CEREBROVASCULAR DISEASE: ICD-10-CM

## 2025-01-24 DIAGNOSIS — E78.2 MIXED HYPERLIPIDEMIA: ICD-10-CM

## 2025-01-24 DIAGNOSIS — R73.03 PREDIABETES: ICD-10-CM

## 2025-01-24 DIAGNOSIS — I10 ESSENTIAL HYPERTENSION: ICD-10-CM

## 2025-01-24 DIAGNOSIS — M75.82 TENDINITIS OF LEFT ROTATOR CUFF: ICD-10-CM

## 2025-01-24 DIAGNOSIS — J34.0 NASAL SEPTUM ULCERATION: ICD-10-CM

## 2025-01-24 DIAGNOSIS — J30.2 SEASONAL ALLERGIES: ICD-10-CM

## 2025-01-24 RX ORDER — LABETALOL 100 MG/1
100 TABLET, FILM COATED ORAL 2 TIMES DAILY
Qty: 180 TABLET | Refills: 3 | Status: SHIPPED | OUTPATIENT
Start: 2025-01-24

## 2025-01-24 RX ORDER — ATORVASTATIN CALCIUM 40 MG/1
40 TABLET, FILM COATED ORAL DAILY
Qty: 90 TABLET | Refills: 3 | Status: SHIPPED | OUTPATIENT
Start: 2025-01-24

## 2025-01-24 RX ORDER — MONTELUKAST SODIUM 10 MG/1
10 TABLET ORAL NIGHTLY
Qty: 90 TABLET | Refills: 3 | Status: SHIPPED | OUTPATIENT
Start: 2025-01-24

## 2025-01-24 RX ORDER — LISINOPRIL AND HYDROCHLOROTHIAZIDE 12.5; 2 MG/1; MG/1
1 TABLET ORAL DAILY
Qty: 90 TABLET | Refills: 3 | Status: SHIPPED | OUTPATIENT
Start: 2025-01-24

## 2025-01-24 NOTE — PROGRESS NOTES
Subjective:      Chief Complaint   Patient presents with    Physical     Labs completed 1/22/25    Lesion     Sores in her nose - states its been there for months off and on - [facility] and states sores went away but as soon as they came back they re-appeared - states has humidifier at home but has not noticed any difference - has been using aquahor and has helped a little     HISTORY OF PRESENT ILLNESS  HPI  HPI obtained per patient report.  Kelly D Baumgarten is a pleasant 54 year old female presenting  for her annual physical.     She reports painful ulcerations in her nose (including on her nasal septum) B/L intermittently for a few months. The lesions resolved during her trip to [place] last month but returned after her trip. She tried using a humidifier in her home without improvement. She has tried aquaphor with mild symptom relief.     She also reports L anterior shoulder pain for about 1 month without preceding injuries. Her pain has been causing difficulty abducting her arm above 90 degrees but     PAST PATIENT HISTORY  Past Medical History:    Allergic rhinitis    Asthma (HCC)    Essential hypertension    Fatigue    HTN (hypertension)    Hyperlipidemia    Hypercholesterolemia    Lateral epicondylitis  of elbow    Mild intermittent asthma without complication (HCC)    Prediabetes    Tobacco dependency     Past Surgical History:   Procedure Laterality Date    Adenoidectomy  1997    Femur/knee surg unlisted      \"L knee surg\"    Other surgical history  1987    Knee    Tonsillectomy  1997       CURRENT MEDICATIONS  Medications Taking[1]    HEALTH MAINTENANCE  Immunization History   Administered Date(s) Administered    Covid-19 Vaccine Moderna 100 mcg/0.5 ml 01/22/2021, 02/19/2021, 11/10/2021    Influenza 09/18/2017, 09/04/2018   Deferred Date(s) Deferred    FLUZONE 6 months and older PFS 0.5 ml (96865) 10/30/2023    Influenza Vaccine Refused 10/28/2021       ALLERGIES AND  DRUG REACTIONS  Allergies[2]    Family History   Problem Relation Age of Onset    Other (Alzheimers) Mother     High Blood Pressure Mother     Depression Mother     Asthma Mother     Dementia Mother     Psychiatric Mother     Hypertension Father     Diabetes Father     Stroke Father     Other (Alzheimers) Maternal Grandmother     Other (Heart problems) Maternal Grandmother     Arthritis Maternal Grandmother     Stroke Maternal Grandmother     Dementia Maternal Grandmother     Diabetes Maternal Grandfather     Stroke Maternal Grandfather     Diabetes Maternal Grandfather     Stroke Maternal Grandfather     Other (Heart problems) Maternal Grandfather     Other (Alzheimers) Paternal Grandmother     Diabetes Paternal Grandmother     High Blood Pressure Paternal Grandmother     Asthma Sister      Social History     Socioeconomic History    Marital status:    Occupational History    Occupation:    Tobacco Use    Smoking status: Former     Current packs/day: 0.00     Types: Cigarettes     Quit date: 2012     Years since quittin.5    Smokeless tobacco: Never   Vaping Use    Vaping status: Never Used   Substance and Sexual Activity    Alcohol use: Yes     Alcohol/week: 5.0 standard drinks of alcohol     Types: 5 Glasses of wine per week     Comment: 1 glass of wine with dinner, 2 glasses on the weekends    Drug use: No    Sexual activity: Yes     Partners: Male   Other Topics Concern    Caffeine Concern Yes    Stress Concern Yes    Weight Concern No    Special Diet No    Exercise No    Seat Belt Yes     Social Drivers of Health      Received from [facility], [facility]    Social Connections    Received from [facility], [facility]    Housing Stability       Review of Systems   Musculoskeletal:  Positive for arthralgias.   All other systems reviewed and are negative.         Objective:      /78   Pulse 68    Temp 98.2 °F (36.8 °C) (Tympanic)   Resp 16   Ht 5' 4\" (1.626 m)   Wt 190 lb 8 oz (86.4 kg)   LMP 01/22/2019   SpO2 98%   BMI 32.70 kg/m²   Body mass index is 32.7 kg/m².    Physical Exam  Vitals reviewed.   Constitutional:       General: She is not in acute distress.     Appearance: She is not ill-appearing, toxic-appearing or diaphoretic.   HENT:      Head: Normocephalic and atraumatic.      Nose:      Comments: Moderate erythema of nasal mucosa   Eyes:      General: No scleral icterus.        Right eye: No discharge.         Left eye: No discharge.      Extraocular Movements: Extraocular movements intact.      Conjunctiva/sclera: Conjunctivae normal.   Cardiovascular:      Rate and Rhythm: Normal rate and regular rhythm.      Heart sounds: Normal heart sounds.   Pulmonary:      Effort: Pulmonary effort is normal.      Breath sounds: Normal breath sounds.   Abdominal:      General: There is no distension.   Musculoskeletal:         General: No swelling, tenderness or deformity.      Cervical back: Neck supple.      Right lower leg: No edema.      Left lower leg: No edema.      Comments: L shoulder exam:    No erythema/edema/tenderness  AROM limited to 90 degrees for abduction due to pain  Unable to tolerate neer's test or empty can test  Hawkin's sign positive  Internal/external rotation tests negative  Speed's test negative   Neurological:      Mental Status: She is alert and oriented to person, place, and time.            Assessment and Plan:      1. Well adult exam (Primary)  2. Essential hypertension  -     Labetalol HCl; Take 1 tablet (100 mg total) by mouth 2 (two) times daily.  Dispense: 180 tablet; Refill: 3  -     Lisinopril-hydroCHLOROthiazide; Take 1 tablet by mouth daily.  Dispense: 90 tablet; Refill: 3  3. Mixed hyperlipidemia  -     Atorvastatin Calcium; Take 1 tablet (40 mg total) by mouth daily.  Dispense: 90 tablet; Refill: 3  4. Prediabetes  -     metFORMIN HCl; Take 1 tablet (500 mg total)  by mouth 2 (two) times daily with meals.  Dispense: 180 tablet; Refill: 3  5. Ischemic cerebrovascular disease  6. Negative depression screening  7. Nasal septum ulceration  -     ENT - INTERNAL  8. Seasonal allergies  -     Montelukast Sodium; Take 1 tablet (10 mg total) by mouth nightly.  Dispense: 90 tablet; Refill: 3  9. Tendinitis of left rotator cuff  -     OP REFERRAL TO [facility]  -     ORTHOPEDIC - INTERNAL    Return in about 1 year (around 1/24/2026) for physical.    Physical  - she is UTD on her pap smears and mammograms per her Gyne  - encouraged to update her screening colonoscopy  - she declines immunizations today    HTN  - she notes that her BP is likely elevated due to feeling more nervous in the office today, but her home BP's are within goal (<140/90)    HLD, prediabetes  - we discussed that her triglycerides and A1C have improved but her cholesterol levels are mildly increased compared to her previous levels. She notes that this may be due to recent increase in her intake of cheese, so recommended cutting back on her saturated fat intake  - recommended continuation of atorvastatin and metformin  - her goal LDL would be <70 for aggressive management of her cerebrovascular risk factors given her history chronic small vessel ischemic disease. Recommended monitoring her venous angioma via MRI brain in 2/2029    Nasal mucosa ulceration  - environmental dryness may be contributing  - she has not been using flonase or patanase nasal spray regularly recently  - recommended continuation of humidifier use and adding on OTC A[place] nasal gel  - if symptoms do not improve with the above, she is advised to consult with ENT    L rotator cuff tendonitis  - recommended ice application, ibuprofen or aleve as needed, and PT  - if symptoms do not improve despite the above, she may consult with Ortho      Patient verbalized understanding of assessment and recommendations. All questions and concerns  were addressed.    Electronically signed by Jordana Damon MD         [1]   Outpatient Medications Marked as Taking for the 1/24/25 encounter (Office Visit) with Jordana Damon MD   Medication Sig Dispense Refill    atorvastatin 40 MG Oral Tab Take 1 tablet (40 mg total) by mouth daily. 90 tablet 3    labetalol 100 MG Oral Tab Take 1 tablet (100 mg total) by mouth 2 (two) times daily. 180 tablet 3    lisinopril-hydroCHLOROthiazide 20-12.5 MG Oral Tab Take 1 tablet by mouth daily. 90 tablet 3    metFORMIN 500 MG Oral Tab Take 1 tablet (500 mg total) by mouth 2 (two) times daily with meals. 180 tablet 3    montelukast 10 MG Oral Tab Take 1 tablet (10 mg total) by mouth nightly. 90 tablet 3    Conj Estrog-Medroxyprogest Ace (PREMPRO) 0.45-1.5 MG Oral Tab Take 1 tablet by mouth daily. 90 tablet 3    albuterol 108 (90 Base) MCG/ACT Inhalation Aero Soln Inhale 2 puffs into the lungs every 4 (four) hours as needed for Wheezing or Shortness of Breath. 6.7 g 0    Glucose Blood (FREESTYLE LITE TEST) In Vitro Strip 1 strip by In Vitro route daily. 100 strip 1    Blood Glucose Monitoring Suppl (FREESTYLE LITE) Does not apply Device Check glucose daily. 1 Device 0    multivitamin Oral Tab Take 1 tablet by mouth daily.      Levocetirizine Dihydrochloride (XYZAL) 5 MG Oral Tab Take 1 tablet (5 mg total) by mouth daily.     [2]   Allergies  Allergen Reactions    Pcn [Penicillins] OTHER (SEE COMMENTS)     Yeast infection    Seasonal

## 2025-03-13 ENCOUNTER — HOSPITAL ENCOUNTER (OUTPATIENT)
Dept: MAMMOGRAPHY | Age: 55
Discharge: HOME | End: 2025-03-13
Attending: OBSTETRICS & GYNECOLOGY
Payer: COMMERCIAL

## 2025-03-13 DIAGNOSIS — Z12.31 ENCOUNTER FOR SCREENING MAMMOGRAM FOR MALIGNANT NEOPLASM OF BREAST: ICD-10-CM

## 2025-03-13 PROCEDURE — 77067 SCR MAMMO BI INCL CAD: CPT | Performed by: OBSTETRICS & GYNECOLOGY

## 2025-03-13 PROCEDURE — 77063 BREAST TOMOSYNTHESIS BI: CPT | Performed by: OBSTETRICS & GYNECOLOGY

## 2025-03-25 ENCOUNTER — TELEMEDICINE (OUTPATIENT)
Dept: TELEHEALTH | Age: 55
End: 2025-03-25
Payer: COMMERCIAL

## 2025-03-25 DIAGNOSIS — J06.9 URI WITH COUGH AND CONGESTION: ICD-10-CM

## 2025-03-25 DIAGNOSIS — J30.89 SEASONAL ALLERGIC RHINITIS DUE TO OTHER ALLERGIC TRIGGER: Primary | ICD-10-CM

## 2025-03-25 PROCEDURE — 98005 SYNCH AUDIO-VIDEO EST LOW 20: CPT | Performed by: PHYSICIAN ASSISTANT

## 2025-03-25 RX ORDER — DICLOFENAC SODIUM 75 MG/1
75 TABLET, DELAYED RELEASE ORAL 2 TIMES DAILY
COMMUNITY

## 2025-03-25 RX ORDER — DIAZEPAM 5 MG/1
5 TABLET ORAL
COMMUNITY
Start: 2025-02-11

## 2025-03-25 NOTE — PROGRESS NOTES
Virtual/Telephone Check-In    Kelly D Baumgarten verbally consents to a Virtual/Telephone Check-In service on 03/25/25.  Patient has been referred to the [facility] website at [email or web address] to review the yearly Consent to Treat document.  Patient understands and accepts financial responsibility for any deductible, co-insurance and/or co-pays associated with this service.       Telehealth Verbal Consent   I conducted a telehealth visit with Kelly D Baumgarten today, 03/25/25, which was completed using two-way, real-time interactive audio and video communication. This has been done in good tony to provide continuity of care in the best interest of the provider-patient relationship, due to the COVID -19 public health crisis/national emergency where restrictions of face-to-face office visits are ongoing. Every conscious effort was taken to allow for sufficient and adequate time to complete the visit.  The patient was made aware of the limitations of the telehealth visit, including treatment limitations as no physical exam could be performed.  The patient was advised to call 911 or to go to the ER in case there was an emergency.  The patient was also advised of the potential privacy & security concerns related to the telehealth platform.   The patient was made aware of where to find [facility]'s notice of privacy practices, telehealth consent form and other related consent forms and documents.  which are located on the [facility] website. The patient verbally agreed to telehealth consent form, related consents and the risks discussed.    Lastly, the patient confirmed that they were in [place].   Included in this visit, time may have been spent reviewing labs, medications, radiology tests and decision making. Appropriate medical decision-making and tests are ordered as detailed in the plan of care above.  Coding/billing information is submitted for this visit based on complexity of care and/or time spent for the visit.    CHIEF  COMPLAINT:     Chief Complaint   Patient presents with    Cough/URI       HPI:   Kelly D Baumgarten is a 54 year old female who presents for a video visit.  Patient reports feeling unwell for about 5 days with scratchy throat.  Reports lots of head congestion, sore throat, clear nasal secretions.  Reports has bad asthma, feeling like she is having a lot of \"gasping\"  But does not feel winded at rest.  Reports has \"make myself yawn to feel like I get enough air.\"  She has not used albuterol inhaler.  She has taken Dayquil for symptoms which helps somewhat.  Felt really yucky this morning but feeling better this afternoon.  Cough is dry, not productive.   She is taking Xyzal and Singulair daily.  She is not currently     Current Outpatient Medications   Medication Sig Dispense Refill    diazePAM 5 MG Oral Tab Take 1 tablet (5 mg total) by mouth.      diclofenac 75 MG Oral Tab EC Take 1 tablet (75 mg total) by mouth 2 (two) times daily.      atorvastatin 40 MG Oral Tab Take 1 tablet (40 mg total) by mouth daily. 90 tablet 3    labetalol 100 MG Oral Tab Take 1 tablet (100 mg total) by mouth 2 (two) times daily. 180 tablet 3    lisinopril-hydroCHLOROthiazide 20-12.5 MG Oral Tab Take 1 tablet by mouth daily. 90 tablet 3    metFORMIN 500 MG Oral Tab Take 1 tablet (500 mg total) by mouth 2 (two) times daily with meals. 180 tablet 3    montelukast 10 MG Oral Tab Take 1 tablet (10 mg total) by mouth nightly. 90 tablet 3    fluticasone propionate 50 MCG/ACT Nasal Suspension USE 2 SPRAYS NASALLY AS NEEDED (Patient not taking: Reported on 1/24/2025) 48 g 1    Conj Estrog-Medroxyprogest Ace (PREMPRO) 0.45-1.5 MG Oral Tab Take 1 tablet by mouth daily. 90 tablet 3    albuterol 108 (90 Base) MCG/ACT Inhalation Aero Soln Inhale 2 puffs into the lungs every 4 (four) hours as needed for Wheezing or Shortness of Breath. 6.7 g 0    Glucose Blood (FREESTYLE LITE TEST) In Vitro Strip 1 strip by In Vitro route daily. 100 strip 1     azelastine 0.1 % Nasal Solution 2 sprays by Nasal route 2 (two) times daily. (Patient not taking: Reported on 2025)      Blood Glucose Monitoring Suppl (FREESTYLE LITE) Does not apply Device Check glucose daily. 1 Device 0    multivitamin Oral Tab Take 1 tablet by mouth daily.      Levocetirizine Dihydrochloride (XYZAL) 5 MG Oral Tab Take 1 tablet (5 mg total) by mouth daily.        Past Medical History:    Allergic rhinitis    Asthma (HCC)    Essential hypertension    Fatigue    HTN (hypertension)    Hyperlipidemia    Hypercholesterolemia    Lateral epicondylitis  of elbow    Mild intermittent asthma without complication (HCC)    Prediabetes    Tobacco dependency      Past Surgical History:   Procedure Laterality Date    Adenoidectomy      Femur/knee surg unlisted      \"L knee surg\"    Other surgical history      Knee    Tonsillectomy           Social History     Socioeconomic History    Marital status:    Occupational History    Occupation:    Tobacco Use    Smoking status: Former     Current packs/day: 0.00     Types: Cigarettes     Quit date: 2012     Years since quittin.6    Smokeless tobacco: Never   Vaping Use    Vaping status: Never Used   Substance and Sexual Activity    Alcohol use: Yes     Alcohol/week: 5.0 standard drinks of alcohol     Types: 5 Glasses of wine per week     Comment: 1 glass of wine with dinner, 2 glasses on the weekends    Drug use: No    Sexual activity: Yes     Partners: Male   Other Topics Concern    Caffeine Concern Yes    Stress Concern Yes    Weight Concern No    Special Diet No    Exercise No    Seat Belt Yes     Social Drivers of Health      [facility], [facility]    Housing Stability         REVIEW OF SYSTEMS:   GENERAL: no fevers  SKIN: no rashes or abnormal skin lesions  HEENT: See HPI  LUNGS: See HPI  CARDIOVASCULAR: denies chest pain or palpitations   GI: denies N/V/C or  abdominal pain      EXAM:   General: Alert, Ill-appearing/sounding, and In no acute distress  Respiratory:   Speaking in full sentences comfortably  Normal work of breathing  Coughing during visit - more throat clearing cuugh  Head: Normocephalic  Nose: Hyponasal sounding. Sniffling often  Skin: No obvious rashes or lesions from what observed.     No results found for this or any previous visit (from the past 24 hours).    ASSESSMENT AND PLAN:   Kelly D Baumgarten is a 54 year old female who presents with symptoms that are consistent with    ASSESSMENT:   Encounter Diagnoses   Name Primary?    Seasonal allergic rhinitis due to other allergic trigger Yes    URI with cough and congestion        PLAN:  Allergies vs viral URI.  Discussed at 3 days of sx low concern for ABRS. Restart Flonase, increase Xyzal to BID--OR-- take Benadryl at bedtime.  Use albuterol at least every 6 hours for next several days.  Close follow up for worsening symptoms.  See patient Instructions        The patient indicates understanding of these issues and agrees to the plan.  The patient is asked to return if sx's persist or worsen.    Face to face time spent on Video Visit: 12  Total Time spent on visit including reviewing history, ordering labs/medication, patient examination and education: 16    Kelly D Baumgarten understands video visit evaluation is not a substitute for face-to-face examination or emergency care. Patient advised to go to ER or call 911 for worsening symptoms or acute distress.

## 2025-05-21 ENCOUNTER — PATIENT MESSAGE (OUTPATIENT)
Dept: OBGYN CLINIC | Facility: CLINIC | Age: 55
End: 2025-05-21

## 2025-05-21 DIAGNOSIS — N95.0 PMB (POSTMENOPAUSAL BLEEDING): Primary | ICD-10-CM

## 2025-05-22 ENCOUNTER — TELEPHONE (OUTPATIENT)
Dept: OBGYN CLINIC | Facility: CLINIC | Age: 55
End: 2025-05-22

## 2025-05-22 NOTE — TELEPHONE ENCOUNTER
See My Chart message patient sent to Dr. Patten yesterday and please advise patient at the phone number on file.  The patient has been in menopause for 8 years and started period yesterday.   Patient is extremely worried and would like some peace of mind before leaving out of town tomorrow.   Please advise patient.    Thank you.

## 2025-05-22 NOTE — TELEPHONE ENCOUNTER
Vlad message sent for more information.  Telephone encounter from today states that she is going out of town tomorrow.

## 2025-05-23 NOTE — TELEPHONE ENCOUNTER
Would recommend patient undergo pelvic ultrasound and be scheduled with Dr. Zapata for an endometrial biopsy for breakthrough/postmenopausal bleeding when she returns.     Please provider her with bleeding precautions.

## 2025-05-23 NOTE — TELEPHONE ENCOUNTER
Patient notified of plan.  Patient verbalized understanding.   Patient states she will call back once she returns from her trip to schedule the US and EMB.  US order pended for provider signature.

## 2025-05-23 NOTE — TELEPHONE ENCOUNTER
Spoke to patient.  Patient states she suddenly felt nauseas at work on Wednesday and went to the bathroom and saw blood in her underwear.  Patient had to wear a panty liner on Wednesday for bright red vaginal bleeding and also experienced a severe headache that evening.  Patient needed one panty liner again on Thursday.  Patient also reports menstrual type cramping pain, rated at a 2 on a 1-10 scale.  Patient denies recent intercourse or any possible injury to cause the vaginal bleeding.  Patient confirms that she takes her Prempro consistently and has not missed any doses.  Patient has not had a period in over 8 years.  Bleeding has now resolved, but minor cramping remains.  Patient leaves for an 8 day trip out of the country tomorrow morning.    Routed to provider- please advise on follow-up.

## 2025-06-24 ENCOUNTER — TELEPHONE (OUTPATIENT)
Dept: FAMILY MEDICINE CLINIC | Facility: CLINIC | Age: 55
End: 2025-06-24

## 2025-06-24 DIAGNOSIS — I10 WHITE COAT SYNDROME WITH DIAGNOSIS OF HYPERTENSION: Primary | ICD-10-CM

## 2025-06-26 ENCOUNTER — HOSPITAL ENCOUNTER (OUTPATIENT)
Dept: ULTRASOUND IMAGING | Age: 55
Discharge: HOME | End: 2025-06-26
Attending: OBSTETRICS & GYNECOLOGY
Payer: COMMERCIAL

## 2025-06-26 DIAGNOSIS — N95.0 PMB (POSTMENOPAUSAL BLEEDING): ICD-10-CM

## 2025-06-26 PROCEDURE — 76830 TRANSVAGINAL US NON-OB: CPT | Performed by: OBSTETRICS & GYNECOLOGY

## 2025-06-26 PROCEDURE — 76856 US EXAM PELVIC COMPLETE: CPT | Performed by: OBSTETRICS & GYNECOLOGY

## 2025-06-27 ENCOUNTER — PATIENT MESSAGE (OUTPATIENT)
Dept: OBGYN CLINIC | Facility: CLINIC | Age: 55
End: 2025-06-27

## 2025-07-14 ENCOUNTER — OFFICE VISIT (OUTPATIENT)
Dept: OBGYN CLINIC | Facility: CLINIC | Age: 55
End: 2025-07-14
Payer: COMMERCIAL

## 2025-07-14 VITALS — BODY MASS INDEX: 33 KG/M2 | DIASTOLIC BLOOD PRESSURE: 84 MMHG | WEIGHT: 192 LBS | SYSTOLIC BLOOD PRESSURE: 132 MMHG

## 2025-07-14 DIAGNOSIS — N95.0 POSTMENOPAUSAL BLEEDING: Primary | ICD-10-CM

## 2025-07-14 PROCEDURE — 88305 TISSUE EXAM BY PATHOLOGIST: CPT | Performed by: OBSTETRICS & GYNECOLOGY

## 2025-07-14 NOTE — PROGRESS NOTES
[facility]  Obstetrics and Gynecology  Procedure Note      Kelly D Baumgarten is a 55 year old  who presents today for endometrial biopsy  Consent form was signed, the procedure was reviewed in detail, and time out performed.  Chaperone offered and declined, but DAIN Bahena in room to assist.     Vitals:    25 1540   BP: 132/84        Procedure details:  The procedure, risks, benefits and alternatives were discussed with the patient. The patient was informed of risks including but not limited to the risk of bleeding, infection, injury and insufficient tissue collection. All questions and concerns were addressed. The patient provided verbal and written consent.     The patient was placed in a supine position with feet positioned into stirrups. A sterile speculum was placed into the vagina and the cervix was visualized.  An Allis clamp was placed on the anterior lip of the cervix.     The endometrial biopsy Pipelle was then advanced through the cervical canal. The uterus sounded to 8 cm. The Pipelle was then engaged with suction force noted and advance in various angles along the uterine cavity. A small amount of endometrial tissue was noted and collected to be sent to pathology. This was repeated for one more pass. The clamp was removed. The patient tolerated the procedure well.     The patient was advised she will be notified with the pathology results for further plan. Advised pelvic rest for a week.  Additionally discussed her VMS - improved with MHT, but still has regular hot flushes. Would consider Ve[facility]ah, provided some educational information.    Follow up pending results.    Flavia Zapata MD  [facility]  2025 3:48 PM

## 2025-07-17 DIAGNOSIS — J30.2 SEASONAL ALLERGIES: ICD-10-CM

## 2025-07-18 ENCOUNTER — RESULTS FOLLOW-UP (OUTPATIENT)
Dept: OBGYN CLINIC | Facility: CLINIC | Age: 55
End: 2025-07-18

## 2025-07-18 DIAGNOSIS — Z51.81 ENCOUNTER FOR MEDICATION MONITORING: Primary | ICD-10-CM

## 2025-07-18 DIAGNOSIS — N95.1 VASOMOTOR SYMPTOMS DUE TO MENOPAUSE: ICD-10-CM

## 2025-07-18 RX ORDER — FLUTICASONE PROPIONATE 50 MCG
2 SPRAY, SUSPENSION (ML) NASAL AS NEEDED
Qty: 48 G | Refills: 3 | Status: SHIPPED | OUTPATIENT
Start: 2025-07-18

## 2025-07-18 NOTE — TELEPHONE ENCOUNTER
Requesting Fluticasone nasal spray  LOV: 1/24/25 Physical  RTC: 1 year  Last Relevant Labs:   Filled: 1/21/25 #48g with 1 refills    No future appointments.    Allergy Medication Protocol [id number]/18/2025 02:24 PM   Protocol Details   In person appointment or virtual visit in the past 12 mos or appointment in next 3 mos    Medication is active on med list     Rx sent to pharmacy per protocol

## 2025-07-24 RX ORDER — FEZOLINETANT 45 MG/1
1 TABLET, FILM COATED ORAL DAILY
Qty: 90 TABLET | Refills: 1 | Status: CANCELLED | OUTPATIENT
Start: 2025-07-24

## 2025-07-24 RX ORDER — FEZOLINETANT 45 MG/1
1 TABLET, FILM COATED ORAL DAILY
Qty: 90 TABLET | Refills: 3 | Status: SHIPPED | OUTPATIENT
Start: 2025-07-24

## 2025-07-28 ENCOUNTER — TELEPHONE (OUTPATIENT)
Dept: OBGYN CLINIC | Facility: CLINIC | Age: 55
End: 2025-07-28

## 2025-08-12 ENCOUNTER — PATIENT MESSAGE (OUTPATIENT)
Dept: FAMILY MEDICINE CLINIC | Facility: CLINIC | Age: 55
End: 2025-08-12

## (undated) DIAGNOSIS — I10 ESSENTIAL HYPERTENSION: ICD-10-CM

## (undated) DIAGNOSIS — R73.02 IGT (IMPAIRED GLUCOSE TOLERANCE): ICD-10-CM

## (undated) DIAGNOSIS — E78.00 PURE HYPERCHOLESTEROLEMIA: ICD-10-CM

## (undated) NOTE — MR AVS SNAPSHOT
[address]  [address] [address]  [phone]               Thank you for choosing us for your health care visit with RAMOS Schofield.   We are glad to serve you and happy to provide you wi Take 5 mL by mouth every 6 (six) hours as needed for cough.    Commonly known as:  CHERATUSSIN AC           Labetalol HCl 100 MG Tabs   TAKE 1 TABLET TWICE A DAY   Commonly known as:  NORMODYNE           Levocetirizine Dihydrochloride 5 MG Tabs   Take 1 tab

## (undated) NOTE — LETTER
08/02/19        [address] [address]      Dear Anders Calles,    [facility] records indicate that you have outstanding lab work and or testing that was ordered for you and has not yet been completed:  Orders Placed This Encoun

## (undated) NOTE — LETTER
ASTHMA ACTION PLAN for Emily Briceno     : 3/27/1970     Date: 10/28/2021  Provider:  Joshua Garcia MD  Phone for doctor or clinic: [facility] [address], [address] [facility] [address] [facility] [phone]

## (undated) NOTE — LETTER
ASTHMA ACTION PLAN for Kelly D Baumgarten     : 3/27/1970     Date: 3/12/2024  Provider:  Jordana Damon MD  Phone for doctor or clinic: [facility], [address] [place] [id number] [phone]           You can use the colors of a traffic light to help learn about your asthma medicines.      1. Green - Go! % of Personal Best Peak Flow Use controller medicine.   Breathing is good  No cough or wheeze  Can work and play Medicine How much to take When to take it    Singulair (Montelukast) 10 mg by mouth daily  Advair 1 puff twice daily        2. Yellow - Caution. 50-79% Personal Best Peak  Flow.  Use reliever medicine to keep an asthma attack from getting bad.   Cough  Wheezing  Tight Chest  Wake up at night Medicine How much to take When to take it    Albuterol inhaler,  2 puffs every four hours as needed         Additional instructions         3. Red - Stop! Danger!  <50% Personal Best Peak  Flow. Take these medications until  Get help from a doctor   Medicine not helping  Breathing is hard and fast  Nose opens wide  Can't walk  Ribs show  Can't talk well Medicine How much to take When to take it    Call 911 or go to the nearest ER.Call your doctor.     Additional Instructions If your symptoms do not improve and you cannot contact your doctor, [facility] or call 911 immediately!     [] Asthma Action Plan reviewed with patient (and caregiver if necessary) and a copy of the plan was given to the patient/caregiver.   [x] Asthma Action Plan reviewed with patient (and caregiver if necessary) on the phone and mailed copy to patient or submitted via "[employer].     Signatures:  Provider  Jordana Damon MD   Patient Caretaker

## (undated) NOTE — LETTER
10/01/21    Roque Cici  [address] [address]    Dear Deysi Up,    [facility] records indicate that you have outstanding lab work. To schedule an appointment please call Central Scheduling at [phone].  You may also schedule on [facility]

## (undated) NOTE — LETTER
07/06/18        [address] [place] [address]      Dear Camden Ring,    [facility] records indicate that you have outstanding lab work and or testing that was ordered for you and has not yet been completed:          Hemoglobin A1C     Ple

## (undated) NOTE — LETTER
09/03/19        [address] [place] [phone]      Dear Catie Handley,    15[facility] records indicate that you have outstanding lab work and or testing that was ordered for you and has not yet been completed:  Orders Placed This Encoun

## (undated) NOTE — LETTER
[facility] IMMEDIATE CARE IN [address]  Dept: [phone]  Dept Fax: [phone]      February 7, 2018    Patient: Antwon Marcano   Date of Visit: 2/7/2018       To Whom It May Concern:    Trista Joya was seen

## (undated) NOTE — LETTER
ASTHMA ACTION PLAN for Sean Covert     : 3/27/1970     Date: 3/4/2019  Provider:  Juan Banks MD  Phone for doctor or clinic: [facility],  [address] [place] [address] Dr Sarkar  [address] [place] [phone]

## (undated) NOTE — LETTER
ASTHMA ACTION PLAN for Joe Ng     : 3/27/1970     Date: 10/29/2020  Provider:  Neetu Lin MD  Phone for doctor or clinic: [facility] [address], [address] [phone]

## (undated) NOTE — MR AVS SNAPSHOT
[address] [id number] [phone]               Thank you for choosing us for your health care visit with Cathryn Menon MD.  We are glad to serve you and happy to provid You can access your MyChart to more actively manage your health care and view more details from this visit by going to [email or web address].   If you've recently had a stay at the Hospital you can access your discharge instructions in [address] by [email or web address] You don’t need to join a gym. Home exercises work great.  Put more priority on exercise in your life                    Visit [facility] online at  [email or web address]

## (undated) NOTE — LETTER
ASTHMA ACTION PLAN for Lisandra Gordon     : 3/27/1970     Date: 2018  Provider:  Arsh Florentino MD  Phone for doctor or clinic: [facility]5 [facility],  [address] [place] [address] Dr Sarkar  [address] [place] [phone]

## (undated) NOTE — Clinical Note
ASTHMA ACTION PLAN for Michelle Chaney     : 3/27/1970     Date: 3/23/2017  Provider:  Davy Guerrero MD  Phone for doctor or clinic: [facility]5 [facility],  [address] [place] [address] Dr Sarkar  [address] [place] [phone]

## (undated) NOTE — LETTER
10/03/19        [address] [place] [phone]      Dear Mandie Nuñez,    [address] [facility] records indicate that you have outstanding lab work and or testing that was ordered for you and has not yet been completed:  Orders Placed This Encoun

## (undated) NOTE — LETTER
Kelly D Baumgarten, :3/27/1970    CONSENT FOR PROCEDURE/SEDATION    1. I authorize the performance upon Kelly D Baumgarten  the following: Endometrial biopsy procedure    2. I authorize Dr. Flavia Patten MD (and whomever is designated as the doctor’s assistant), to perform the above-mentioned procedures.    3. If any unforeseen conditions arise during this procedure calling for additional  procedures, operations, or medications (including anesthesia and blood transfusion), I further request and authorize the doctor to do whatever he/she deems advisable in my interest.    4. I consent to the taking and reproduction of any photographs in the course of this procedure for professional purposes.    5. I consent to the administration of such sedation as may be considered necessary or advisable by the physician responsible for this service, with the exception of ______________________________________________________    6. I have been informed by my doctor of the nature and purpose of this procedure sedation, possible alternative methods of treatment, risk involved and possible complications.    7. If I have a Do Not Resuscitate (DNR) order in place, the physician and I (or the individual authorized to consent on my behalf) will discuss and agree as to whether the Do Not Resuscitate (DNR) order will remain in effect or will be discontinued during the performance of the procedure and the applicable recovery period. If the Do Not Resuscitate (DNR) order is discontinued and is to be reinstated following the procedure/recovery period, the physician will determine when the applicable recovery period ends for purposes of reinstating the Do Not Resuscitate (DNR) order.    Signature of Patient:_______________________________________________    Signature of person authorized to consent for patient:  _______________________________________________________________    Relationship to patient:  ____________________________________________    Witness: _________________________________________ Date:___________     Physician Signature: _______________________________ Date:___________